# Patient Record
Sex: FEMALE | Race: WHITE | NOT HISPANIC OR LATINO | Employment: FULL TIME | URBAN - METROPOLITAN AREA
[De-identification: names, ages, dates, MRNs, and addresses within clinical notes are randomized per-mention and may not be internally consistent; named-entity substitution may affect disease eponyms.]

---

## 2017-02-21 ENCOUNTER — TELEPHONE (OUTPATIENT)
Dept: GASTROENTEROLOGY | Facility: CLINIC | Age: 57
End: 2017-02-21

## 2017-02-21 DIAGNOSIS — R10.13 EPIGASTRIC PAIN: Primary | ICD-10-CM

## 2017-02-21 NOTE — TELEPHONE ENCOUNTER
From     Radha Moody      To     [facility]      Sent     2/21/2017 10:27 AM            Good Morning!   I wanted to let you know that my stomach still hurts and was wondering if we should do an upper GI.  I have tried to keep track of foods or when it hurts, but there isn't any specific thing that sets it off. Also, my sister just had the test and she had two polyps.  Please let me know what we should do next. Thanks      Message sent to Dr Fenton.

## 2017-03-17 ENCOUNTER — OUTSIDE FACILITY SERVICE (OUTPATIENT)
Dept: GASTROENTEROLOGY | Facility: CLINIC | Age: 57
End: 2017-03-17

## 2017-03-17 PROCEDURE — 43239 EGD BIOPSY SINGLE/MULTIPLE: CPT | Performed by: INTERNAL MEDICINE

## 2017-03-23 ENCOUNTER — TELEPHONE (OUTPATIENT)
Dept: GASTROENTEROLOGY | Facility: CLINIC | Age: 57
End: 2017-03-23

## 2017-03-23 NOTE — TELEPHONE ENCOUNTER
Gastric polyps were benign - all other bx were normal.  Trial carafate tablets - sched o/v 6-8 weeks with me or np

## 2017-03-23 NOTE — TELEPHONE ENCOUNTER
Call to pt.  Advise per Dr Fenton that gastric polyps were benign - all other bx were normal.  Trial carafate tablets.  Pt verb understanding and states has prescription from Dr Fenton for Carafate fill.  Appt scheduled with JAMIE Camara, for 5/1/17 @ 5135.

## 2017-05-01 ENCOUNTER — OFFICE VISIT (OUTPATIENT)
Dept: GASTROENTEROLOGY | Facility: CLINIC | Age: 57
End: 2017-05-01

## 2017-05-01 VITALS
WEIGHT: 227.4 LBS | SYSTOLIC BLOOD PRESSURE: 132 MMHG | DIASTOLIC BLOOD PRESSURE: 82 MMHG | HEIGHT: 70 IN | BODY MASS INDEX: 32.56 KG/M2

## 2017-05-01 DIAGNOSIS — K63.5 COLON POLYPS: ICD-10-CM

## 2017-05-01 DIAGNOSIS — K59.04 CHRONIC IDIOPATHIC CONSTIPATION: ICD-10-CM

## 2017-05-01 DIAGNOSIS — K21.9 GASTROESOPHAGEAL REFLUX DISEASE WITHOUT ESOPHAGITIS: Primary | ICD-10-CM

## 2017-05-01 PROBLEM — K31.84 GASTROPARESIS: Status: ACTIVE | Noted: 2017-05-01

## 2017-05-01 PROCEDURE — 99213 OFFICE O/P EST LOW 20 MIN: CPT | Performed by: NURSE PRACTITIONER

## 2017-05-01 RX ORDER — CEVIMELINE HYDROCHLORIDE 30 MG/1
CAPSULE ORAL
Refills: 5 | COMMUNITY
Start: 2017-04-18 | End: 2017-05-01 | Stop reason: ALTCHOICE

## 2017-05-01 RX ORDER — ESCITALOPRAM OXALATE 20 MG/1
TABLET ORAL
Refills: 1 | COMMUNITY
Start: 2017-04-11 | End: 2019-07-23

## 2017-05-24 ENCOUNTER — TELEPHONE (OUTPATIENT)
Dept: GASTROENTEROLOGY | Facility: CLINIC | Age: 57
End: 2017-05-24

## 2017-05-24 RX ORDER — SUCRALFATE 1 G/1
TABLET ORAL
Qty: 360 TABLET | Refills: 0 | Status: SHIPPED | OUTPATIENT
Start: 2017-05-24 | End: 2017-09-12 | Stop reason: SDUPTHER

## 2017-07-19 ENCOUNTER — TELEPHONE (OUTPATIENT)
Dept: GASTROENTEROLOGY | Facility: CLINIC | Age: 57
End: 2017-07-19

## 2017-07-19 RX ORDER — DEXLANSOPRAZOLE 60 MG/1
60 CAPSULE, DELAYED RELEASE ORAL DAILY
Qty: 90 CAPSULE | Refills: 2 | Status: SHIPPED | OUTPATIENT
Start: 2017-07-19 | End: 2018-05-16 | Stop reason: SDUPTHER

## 2017-08-10 ENCOUNTER — APPOINTMENT (OUTPATIENT)
Dept: GENERAL RADIOLOGY | Facility: HOSPITAL | Age: 57
End: 2017-08-10

## 2017-08-10 ENCOUNTER — HOSPITAL ENCOUNTER (EMERGENCY)
Facility: HOSPITAL | Age: 57
Discharge: HOME | End: 2017-08-10
Attending: EMERGENCY MEDICINE | Admitting: EMERGENCY MEDICINE

## 2017-08-10 VITALS
OXYGEN SATURATION: 97 % | WEIGHT: 230 LBS | RESPIRATION RATE: 16 BRPM | HEART RATE: 72 BPM | SYSTOLIC BLOOD PRESSURE: 118 MMHG | BODY MASS INDEX: 32.93 KG/M2 | TEMPERATURE: 98.4 F | HEIGHT: 70 IN | DIASTOLIC BLOOD PRESSURE: 70 MMHG

## 2017-08-10 DIAGNOSIS — M79.671 ACUTE FOOT PAIN, RIGHT: Primary | ICD-10-CM

## 2017-08-10 PROCEDURE — 99283 EMERGENCY DEPT VISIT LOW MDM: CPT

## 2017-08-10 PROCEDURE — 73630 X-RAY EXAM OF FOOT: CPT

## 2017-08-10 RX ORDER — NAPROXEN 500 MG/1
500 TABLET ORAL 2 TIMES DAILY PRN
Qty: 15 TABLET | Refills: 0 | Status: SHIPPED | OUTPATIENT
Start: 2017-08-10 | End: 2020-01-17 | Stop reason: HOSPADM

## 2017-08-10 NOTE — ED PROVIDER NOTES
" EMERGENCY DEPARTMENT ENCOUNTER    CHIEF COMPLAINT  Chief Complaint: foot pain  History given by: pt  History limited by: none  Room Number: HAL/C  PMD: Radha Sepulveda MD      HPI:  Pt is a 57 y.o. female who presents complaining of R foot pain which onset about 1 week ago. Pt does not recall any injuries to the affected foot. In the last 2 days, pain has begun radiating up her RLE. She has no h/o gout and denies tobacco use. There are no other sx complaints at this time.     Duration:  1 week  Onset: gradual  Timing: constant  Location: R foot   Radiation: up RLE  Quality: \"pain\"  Intensity/Severity: moderate  Progression: unchanged  Associated Symptoms: none stated  Aggravating Factors: none stated  Alleviating Factors: none stated  Previous Episodes: none stated  Treatment before arrival: none stated    PAST MEDICAL HISTORY  Active Ambulatory Problems     Diagnosis Date Noted   • Chronic idiopathic constipation 05/01/2017   • Colon polyps 05/01/2017   • Gastroesophageal reflux disease without esophagitis 05/01/2017   • Gastroparesis 05/01/2017     Resolved Ambulatory Problems     Diagnosis Date Noted   • No Resolved Ambulatory Problems     Past Medical History:   Diagnosis Date   • Gastroparesis    • GERD (gastroesophageal reflux disease)    • HX: breast cancer 2010   • Hyperlipidemia    • Internal hemorrhoid    • RLS (restless legs syndrome)    • Thyroid disease        PAST SURGICAL HISTORY  Past Surgical History:   Procedure Laterality Date   • BREAST LUMPECTOMY     • BREAST RECONSTRUCTION     • CHOLECYSTECTOMY     • COLONOSCOPY  2011    Per pt. IH, diverticulitis, otherwise normal (dr. Matias)   • COLONOSCOPY N/A 6/22/2016    meghan;yps, tics, NBIH, hyperplastic polyp x 2   • UPPER GASTROINTESTINAL ENDOSCOPY  03/17/2017    multiple fundic gland polyps       FAMILY HISTORY  History reviewed. No pertinent family history.    SOCIAL HISTORY  Social History     Social History   • Marital status:  "     Spouse name: N/A   • Number of children: N/A   • Years of education: N/A     Occupational History   • Not on file.     Social History Main Topics   • Smoking status: Never Smoker   • Smokeless tobacco: Not on file   • Alcohol use Yes      Comment: occasssionally   • Drug use: No   • Sexual activity: Not on file     Other Topics Concern   • Not on file     Social History Narrative       ALLERGIES  Review of patient's allergies indicates no known allergies.    REVIEW OF SYSTEMS  Review of Systems   Constitutional: Negative for fever.   Eyes: Negative.    Musculoskeletal: Positive for arthralgias (R foot).   Skin: Negative for rash.   Neurological: Negative for weakness and numbness.   Hematological: Negative.    All other systems reviewed and are negative.      PHYSICAL EXAM  ED Triage Vitals   Temp Heart Rate Resp BP SpO2   08/10/17 1519 08/10/17 1519 08/10/17 1519 08/10/17 1519 08/10/17 1519   98.4 °F (36.9 °C) 108 18 148/93 98 %      Temp src Heart Rate Source Patient Position BP Location FiO2 (%)   08/10/17 1519 08/10/17 1519 08/10/17 1519 08/10/17 1519 --   Tympanic Monitor Standing Right arm        Physical Exam   Constitutional: She is oriented to person, place, and time and well-developed, well-nourished, and in no distress. No distress.   HENT:   Head: Normocephalic and atraumatic.   Eyes: EOM are normal.   Neck: Normal range of motion.   Pulmonary/Chest: Effort normal. No respiratory distress.   Musculoskeletal: She exhibits tenderness (minimal over top and distal foot ).   There is no achilles tenderness   Neurological: She is alert and oriented to person, place, and time.   Skin: Skin is warm and dry. No pallor.   Psychiatric: Mood, memory, affect and judgment normal.   Nursing note and vitals reviewed.        RADIOLOGY  XR Foot 3+ View Right        THREE-VIEW RIGHT FOOT     HISTORY: Foot pain. No definite trauma.     FINDINGS: There is some extremely minimal degenerative changes at the  1st MTP joint.  The foot is otherwise unremarkable.                I ordered the above noted radiological studies. Interpreted by radiologist.Reviewed by me in PACS.       PROCEDURES  Procedures      PROGRESS AND CONSULTS  ED Course   Comment By Time   5:13 PM  Patient with foot pain of unclear etiology.  Pain on palpation.  No redness to suggest gout or infection.  May have plantar fascitis.  Will treat with antiinflammatories.  Hard soled shoe.  Follow up with PMD.  Normal pulse and sensation. Solomon Mckeon MD 08/10 1714   1609: Ordered XR R Foot to r/o breaks/fractures.     1711: Pt is resting in NAD. Informed her of XR results which show early signs of arthritis but r/o fractures. Discussed unlikelihood of gout and plantar fascitis but did not completely r/o. Will d/c with anti-inflammatories. Pt will f/u with PCP if pain persists. Pt understands and agrees with the plan. All questions answered.      MEDICAL DECISION MAKING  Results were reviewed/discussed with the patient and they were also made aware of online access. Pt also made aware that some labs, such as cultures, will not be resulted during ER visit and follow up with PMD is necessary.     MDM  Number of Diagnoses or Management Options     Amount and/or Complexity of Data Reviewed  Tests in the radiology section of CPT®: ordered and reviewed (THREE-VIEW RIGHT FOOT     HISTORY: Foot pain. No definite trauma.     FINDINGS: There is some extremely minimal degenerative changes at the  1st MTP joint. The foot is otherwise unremarkable.)  Decide to obtain previous medical records or to obtain history from someone other than the patient: yes  Review and summarize past medical records: yes  Independent visualization of images, tracings, or specimens: yes    Patient Progress  Patient progress: stable         DIAGNOSIS  Final diagnoses:   Acute foot pain, right       DISPOSITION  DISCHARGE    Patient discharged in stable condition.    Reviewed implications of results,  diagnosis, meds, responsibility to follow up, warning signs and symptoms of possible worsening, potential complications and reasons to return to ER, including new or worsening sx.    Patient/Family voiced understanding of above instructions.    Discussed plan for discharge, as there is no emergent indication for admission.  Pt/family is agreeable and understands need for follow up and repeat testing.  Pt is aware that discharge does not mean that nothing is wrong but it indicates no emergency is present that requires admission and they must continue care with follow-up as given below or physician of their choice.     FOLLOW-UP  No follow-up provider specified.       Medication List      Notice     No changes were made to your prescriptions during this visit.            Latest Documented Vital Signs:  As of 5:15 PM  BP- 112/68 HR- 86 Temp- 98.4 °F (36.9 °C) (Tympanic) O2 sat- 97%    --  Documentation assistance provided by jessica Sánchez for Dr. Mckeon.  Information recorded by the scribe was done at my direction and has been verified and validated by me.       Chichi Sánchez  08/10/17 9043       Solomon Mckeon MD  08/10/17 2677

## 2017-09-12 RX ORDER — SUCRALFATE 1 G/1
TABLET ORAL
Qty: 360 TABLET | Refills: 0 | Status: SHIPPED | OUTPATIENT
Start: 2017-09-12 | End: 2019-07-23

## 2017-09-27 ENCOUNTER — TELEPHONE (OUTPATIENT)
Dept: GASTROENTEROLOGY | Facility: CLINIC | Age: 57
End: 2017-09-27

## 2017-09-27 NOTE — TELEPHONE ENCOUNTER
Faxed request received from [facility] re: Sucralfate 1000 mg tabs - one tab po ac meals and at hs.  Note that med is on back order and cannot get.  Asking if want to change to suspension.    Call to [facility] @ [phone] and spoke with pharmacist Иван.  He states that Carafate suspension cost for pt would be $5.  Advise may switch to Carafate Suspension 1 G/10 ml - take 1 G by mouth before meals and at bedtime, 360 G, R0.  Иван R&V.

## 2018-05-16 ENCOUNTER — TELEPHONE (OUTPATIENT)
Dept: GASTROENTEROLOGY | Facility: CLINIC | Age: 58
End: 2018-05-16

## 2018-05-16 RX ORDER — DEXLANSOPRAZOLE 60 MG/1
60 CAPSULE, DELAYED RELEASE ORAL DAILY
Qty: 90 CAPSULE | Refills: 0 | Status: SHIPPED | OUTPATIENT
Start: 2018-05-16 | End: 2018-06-14 | Stop reason: SDUPTHER

## 2018-05-16 NOTE — TELEPHONE ENCOUNTER
----- Message from Susanne Santos sent at 5/15/2018 12:30 PM EDT -----  Regarding: PT CALLED - DEXILANT SCRIPT 60MG QD  Contact: [phone]  PT REQUESTING 90 DAY SCRIPT BE CALLED INTO HER LOCAL PHARM. [facility]ARM ON FILE IS CORRECT. PHARM ADVISED HER IT WAS ON HOLD.

## 2018-05-16 NOTE — TELEPHONE ENCOUNTER
Called pt and advised pt she is due for her yearly f/u.  Advised pt she can make an appt and we can send in a 90 day supply of her dexilant to get her by till then.  Pt verb understanding and made appt for 06/14 at 1030am with Keisha Kramer.  Script for dexilant 60mg po daily #90 was escribed to pt's [facility] pharmacy.

## 2018-06-14 ENCOUNTER — OFFICE VISIT (OUTPATIENT)
Dept: GASTROENTEROLOGY | Facility: CLINIC | Age: 58
End: 2018-06-14

## 2018-06-14 VITALS
SYSTOLIC BLOOD PRESSURE: 108 MMHG | BODY MASS INDEX: 31.61 KG/M2 | HEIGHT: 70 IN | WEIGHT: 220.8 LBS | TEMPERATURE: 98.6 F | DIASTOLIC BLOOD PRESSURE: 70 MMHG

## 2018-06-14 DIAGNOSIS — Z83.71 FAMILY HISTORY OF POLYPS IN THE COLON: ICD-10-CM

## 2018-06-14 DIAGNOSIS — K63.5 HYPERPLASTIC COLONIC POLYP, UNSPECIFIED PART OF COLON: ICD-10-CM

## 2018-06-14 DIAGNOSIS — K59.04 CHRONIC IDIOPATHIC CONSTIPATION: Primary | ICD-10-CM

## 2018-06-14 DIAGNOSIS — K21.9 GASTROESOPHAGEAL REFLUX DISEASE WITHOUT ESOPHAGITIS: ICD-10-CM

## 2018-06-14 PROCEDURE — 99214 OFFICE O/P EST MOD 30 MIN: CPT | Performed by: NURSE PRACTITIONER

## 2018-06-14 RX ORDER — LISINOPRIL 2.5 MG/1
2.5 TABLET ORAL DAILY
COMMUNITY
End: 2019-07-23

## 2018-06-14 RX ORDER — DEXLANSOPRAZOLE 60 MG/1
60 CAPSULE, DELAYED RELEASE ORAL DAILY
Qty: 90 CAPSULE | Refills: 3 | Status: SHIPPED | OUTPATIENT
Start: 2018-06-14 | End: 2019-07-23 | Stop reason: SDUPTHER

## 2018-06-14 RX ORDER — ATORVASTATIN CALCIUM 20 MG/1
20 TABLET, FILM COATED ORAL
COMMUNITY
Start: 2018-04-02 | End: 2019-04-02

## 2018-06-14 RX ORDER — OMEGA-3-ACID ETHYL ESTERS 1 G/1
4 CAPSULE, LIQUID FILLED ORAL
COMMUNITY
Start: 2018-03-14 | End: 2019-03-14

## 2018-06-14 NOTE — PROGRESS NOTES
Chief Complaint   Patient presents with   • Follow-up   • Heartburn       Radha Moody is a  57 y.o. female here for a follow up visit for GERD and constipation.     HPI  58 yo f presents today for follow up visit for GERD and constipation. She is a patient of Dr. Fenton. She was last seen in office on 5/2017. She has hx GERD and does well on Dexilant 60 mg daily. She also has hx constipation and admits the Linzess 145 daily is just too much. She has not tried the Linzess 72. She denies any dysphagia, reflux, abd pain, N&V, diarrhea, rectal bleeding or melena. She admits her appetite is good and her weight is stable. She does eat activia now and believes that helps some. Her last EGD was done 3/2017 and her last Colonoscopy was done 6/2016. She does have hx diverticulosis, NBIH and HP colon polyps. She has FH pre-cancerous colon polyps. Next surveillance colonoscopy will be in 6/2021.     Past Medical History:   Diagnosis Date   • Gastroparesis    • GERD (gastroesophageal reflux disease)    • HX: breast cancer 2010   • Hyperlipidemia    • Internal hemorrhoid    • RLS (restless legs syndrome)    • Thyroid disease        Past Surgical History:   Procedure Laterality Date   • BREAST LUMPECTOMY     • BREAST RECONSTRUCTION     • CHOLECYSTECTOMY     • COLONOSCOPY  2011    Per pt. IH, diverticulitis, otherwise normal (dr. Matias)   • COLONOSCOPY N/A 6/22/2016    meghan;yps, tics, NBIH, hyperplastic polyp x 2   • UPPER GASTROINTESTINAL ENDOSCOPY  03/17/2017    multiple fundic gland polyps       Scheduled Meds:    Continuous Infusions:  No current facility-administered medications for this visit.     PRN Meds:.    No Known Allergies    Social History     Social History   • Marital status:      Spouse name: N/A   • Number of children: N/A   • Years of education: N/A     Occupational History   • Not on file.     Social History Main Topics   • Smoking status: Never Smoker   • Smokeless tobacco: Not on file   • Alcohol  use Yes      Comment: occasssionally   • Drug use: No   • Sexual activity: Not on file     Other Topics Concern   • Not on file     Social History Narrative   • No narrative on file       History reviewed. No pertinent family history.    Review of Systems   Constitutional: Negative for appetite change, chills, diaphoresis, fatigue, fever and unexpected weight change.   HENT: Negative for nosebleeds, postnasal drip, sore throat, trouble swallowing and voice change.    Respiratory: Negative for cough, choking, chest tightness, shortness of breath and wheezing.    Cardiovascular: Negative for chest pain.   Gastrointestinal: Positive for constipation. Negative for abdominal distention, abdominal pain, anal bleeding, blood in stool, diarrhea, nausea, rectal pain and vomiting.   Endocrine: Negative for polydipsia, polyphagia and polyuria.   Musculoskeletal: Negative for gait problem.   Skin: Negative for rash and wound.   Allergic/Immunologic: Negative for food allergies.   Neurological: Negative for dizziness, speech difficulty and light-headedness.   Psychiatric/Behavioral: Negative for confusion, self-injury, sleep disturbance and suicidal ideas.       Vitals:    06/14/18 1033   BP: 108/70   Temp: 98.6 °F (37 °C)       Physical Exam   Constitutional: She is oriented to person, place, and time. She appears well-developed and well-nourished. She does not appear ill. No distress.   HENT:   Head: Normocephalic.   Eyes: Pupils are equal, round, and reactive to light.   Cardiovascular: Normal rate, regular rhythm and normal heart sounds.    Pulmonary/Chest: Effort normal and breath sounds normal.   Abdominal: Soft. Bowel sounds are normal. She exhibits no distension and no mass. There is no hepatosplenomegaly. There is no tenderness. There is no rebound and no guarding. No hernia.   Musculoskeletal: Normal range of motion.   Neurological: She is alert and oriented to person, place, and time.   Skin: Skin is warm and dry.    Psychiatric: She has a normal mood and affect. Her speech is normal and behavior is normal. Judgment normal.       No images are attached to the encounter.    Assessment & Plan    1. Chronic idiopathic constipation    2. Gastroesophageal reflux disease without esophagitis  - dexlansoprazole (DEXILANT) 60 MG capsule; Take 1 capsule by mouth Daily.  Dispense: 90 capsule; Refill: 3    3. Hyperplastic colonic polyp, unspecified part of colon    4. Family history of polyps in the colon    GERD is well controlled on Dexilant. I will refill x 1 year. Constipation is not well controlled. Will give samples of the Linzess 72 mcg daily for patient to try. Patient to call office in 2-3 weeks with update. Follow up with Dr. Fenton in 1 year. Call office with any issues.

## 2018-07-16 ENCOUNTER — TELEPHONE (OUTPATIENT)
Dept: GASTROENTEROLOGY | Facility: CLINIC | Age: 58
End: 2018-07-16

## 2018-07-16 NOTE — TELEPHONE ENCOUNTER
----- Message from Radha Moody sent at 7/16/2018  1:15 PM EDT -----  Regarding: Prescription Question  Contact: [phone]  The samples of Linzess you gave me were perfect. Please refill the 72 mg rx at [facility]. Thanks Radha

## 2018-11-12 ENCOUNTER — APPOINTMENT (OUTPATIENT)
Dept: WOMENS IMAGING | Facility: HOSPITAL | Age: 58
End: 2018-11-12

## 2018-11-12 PROCEDURE — 77080 DXA BONE DENSITY AXIAL: CPT | Performed by: RADIOLOGY

## 2018-12-11 ENCOUNTER — HOSPITAL ENCOUNTER (OUTPATIENT)
Dept: CARDIOLOGY | Facility: HOSPITAL | Age: 58
Discharge: HOME | End: 2018-12-11
Attending: OTOLARYNGOLOGY | Admitting: OTOLARYNGOLOGY

## 2018-12-11 ENCOUNTER — TRANSCRIBE ORDERS (OUTPATIENT)
Dept: ADMINISTRATIVE | Facility: HOSPITAL | Age: 58
End: 2018-12-11

## 2018-12-11 DIAGNOSIS — J34.3 HYPERTROPHY OF NASAL TURBINATES: ICD-10-CM

## 2018-12-11 DIAGNOSIS — J34.3 HYPERTROPHY OF NASAL TURBINATES: Primary | ICD-10-CM

## 2018-12-11 PROCEDURE — 93005 ELECTROCARDIOGRAM TRACING: CPT | Performed by: OTOLARYNGOLOGY

## 2018-12-11 PROCEDURE — 93010 ELECTROCARDIOGRAM REPORT: CPT | Performed by: INTERNAL MEDICINE

## 2019-05-28 ENCOUNTER — OFFICE VISIT (OUTPATIENT)
Dept: RETAIL CLINIC | Facility: CLINIC | Age: 59
End: 2019-05-28

## 2019-05-28 DIAGNOSIS — L73.9 FOLLICULITIS: Primary | ICD-10-CM

## 2019-05-28 PROCEDURE — 99213 OFFICE O/P EST LOW 20 MIN: CPT | Performed by: NURSE PRACTITIONER

## 2019-05-28 RX ORDER — TRIAMCINOLONE ACETONIDE 5 MG/G
CREAM TOPICAL 3 TIMES DAILY
Qty: 30 G | Refills: 0 | Status: SHIPPED | OUTPATIENT
Start: 2019-05-28 | End: 2020-02-13

## 2019-05-28 RX ORDER — VENLAFAXINE HYDROCHLORIDE 150 MG/1
225 CAPSULE, EXTENDED RELEASE ORAL NIGHTLY
COMMUNITY
End: 2022-10-26

## 2019-05-28 NOTE — PATIENT INSTRUCTIONS
Folliculitis  Folliculitis is inflammation of the hair follicles. Folliculitis most commonly occurs on the scalp, thighs, legs, back, and buttocks. However, it can occur anywhere on the body.  What are the causes?  This condition may be caused by:  · A bacterial infection (common).  · A fungal infection.  · A viral infection.  · Coming into contact with certain chemicals, especially oils and tars.  · Shaving or waxing.  · Applying greasy ointments or creams to your skin often.    Long-lasting folliculitis and folliculitis that keeps coming back can be caused by bacteria that live in the nostrils.  What increases the risk?  This condition is more likely to develop in people with:  · A weakened immune system.  · Diabetes.  · Obesity.    What are the signs or symptoms?  Symptoms of this condition include:  · Redness.  · Soreness.  · Swelling.  · Itching.  · Small white or yellow, pus-filled, itchy spots (pustules) that appear over a reddened area. If there is an infection that goes deep into the follicle, these may develop into a boil (furuncle).  · A group of closely packed boils (carbuncle). These tend to form in hairy, sweaty areas of the body.    How is this diagnosed?  This condition is diagnosed with a skin exam. To find what is causing the condition, your health care provider may take a sample of one of the pustules or boils for testing.  How is this treated?  This condition may be treated by:  · Applying warm compresses to the affected areas.  · Taking an antibiotic medicine or applying an antibiotic medicine to the skin.  · Applying or bathing with an antiseptic solution.  · Taking an over-the-counter medicine to help with itching.  · Having a procedure to drain any pustules or boils. This may be done if a pustule or boil contains a lot of pus or fluid.  · Laser hair removal. This may be done to treat long-lasting folliculitis.    Follow these instructions at home:  · If directed, apply heat to the affected  area as often as told by your health care provider. Use the heat source that your health care provider recommends, such as a moist heat pack or a heating pad.  ? Place a towel between your skin and the heat source.  ? Leave the heat on for 20-30 minutes.  ? Remove the heat if your skin turns bright red. This is especially important if you are unable to feel pain, heat, or cold. You may have a greater risk of getting burned.  · If you were prescribed an antibiotic medicine, use it as told by your health care provider. Do not stop using the antibiotic even if you start to feel better.  · Take over-the-counter and prescription medicines only as told by your health care provider.  · Do not shave irritated skin.  · Keep all follow-up visits as told by your health care provider. This is important.  Get help right away if:  · You have more redness, swelling, or pain in the affected area.  · Red streaks are spreading from the affected area.  · You have a fever.  This information is not intended to replace advice given to you by your health care provider. Make sure you discuss any questions you have with your health care provider.  Document Released: 02/26/2003 Document Revised: 07/07/2017 Document Reviewed: 10/07/2016  Elsevier Interactive Patient Education © 2019 [employer]

## 2019-05-28 NOTE — PROGRESS NOTES
Subjective:     Radha Moody is a 58 y.o.     HPI      The following portions of the patient's history were reviewed and updated as appropriate: allergies, current medications, past family history, past medical history, past social history, past surgical history and problem list.      Review of Systems      Objective:      Physical Exam        There are no diagnoses linked to this encounter.

## 2019-05-28 NOTE — PROGRESS NOTES
Subjective:     Radha Moody is a 58 y.o.     Rash   Episode onset: started 5 days ago. The affected locations include the right axilla and left axilla. The rash is characterized by redness, itchiness and burning. She was exposed to nothing. Pertinent negatives include no fever, shortness of breath or sore throat. Treatments tried: peroxide, baby powder, facial cleanser. The treatment provided no relief.         The following portions of the patient's history were reviewed and updated as appropriate: allergies, current medications, past family history, past medical history, past social history, past surgical history and problem list.      Review of Systems   Constitutional: Negative for fever.   HENT: Negative for sore throat.    Respiratory: Negative for shortness of breath.    Skin: Positive for rash (under both arms, red and itchy).         Objective:      Physical Exam   Cardiovascular: Normal rate, regular rhythm, S1 normal, S2 normal and normal heart sounds.   Pulmonary/Chest: Effort normal and breath sounds normal.   Skin: Rash (bilateral axillae erythematous folliculi noted, one pustular) noted.   Vitals reviewed.          Diagnoses and all orders for this visit:    Folliculitis    Other orders  -     triamcinolone (KENALOG) 0.5 % cream; Apply  topically to the appropriate area as directed 3 (Three) Times a Day. To affected area for contact dermatitis  -     mupirocin (BACTROBAN) 2 % ointment; Apply  topically to the appropriate area as directed 2 (Two) Times a Day for 7 days.

## 2019-06-27 DIAGNOSIS — K21.9 GASTROESOPHAGEAL REFLUX DISEASE WITHOUT ESOPHAGITIS: ICD-10-CM

## 2019-06-27 RX ORDER — DEXLANSOPRAZOLE 60 MG/1
CAPSULE, DELAYED RELEASE ORAL
Qty: 90 CAPSULE | Refills: 3 | OUTPATIENT
Start: 2019-06-27

## 2019-07-23 ENCOUNTER — OFFICE VISIT (OUTPATIENT)
Dept: GASTROENTEROLOGY | Facility: CLINIC | Age: 59
End: 2019-07-23

## 2019-07-23 VITALS
TEMPERATURE: 97.8 F | WEIGHT: 217.4 LBS | SYSTOLIC BLOOD PRESSURE: 122 MMHG | HEIGHT: 70 IN | DIASTOLIC BLOOD PRESSURE: 68 MMHG | BODY MASS INDEX: 31.12 KG/M2

## 2019-07-23 DIAGNOSIS — K63.5 HYPERPLASTIC COLONIC POLYP, UNSPECIFIED PART OF COLON: ICD-10-CM

## 2019-07-23 DIAGNOSIS — K21.9 GASTROESOPHAGEAL REFLUX DISEASE WITHOUT ESOPHAGITIS: ICD-10-CM

## 2019-07-23 DIAGNOSIS — K59.04 CHRONIC IDIOPATHIC CONSTIPATION: Primary | ICD-10-CM

## 2019-07-23 PROCEDURE — 99213 OFFICE O/P EST LOW 20 MIN: CPT | Performed by: NURSE PRACTITIONER

## 2019-07-23 RX ORDER — ATORVASTATIN CALCIUM 80 MG/1
80 TABLET, FILM COATED ORAL NIGHTLY
COMMUNITY
Start: 2018-09-28

## 2019-07-23 RX ORDER — SITAGLIPTIN 100 MG/1
100 TABLET, FILM COATED ORAL DAILY
COMMUNITY
Start: 2018-09-28 | End: 2020-02-13

## 2019-07-23 RX ORDER — DEXLANSOPRAZOLE 60 MG/1
60 CAPSULE, DELAYED RELEASE ORAL DAILY
Qty: 90 CAPSULE | Refills: 3 | Status: SHIPPED | OUTPATIENT
Start: 2019-07-23 | End: 2020-07-09

## 2019-07-23 RX ORDER — OMEGA-3-ACID ETHYL ESTERS 1 G/1
4 CAPSULE, LIQUID FILLED ORAL NIGHTLY
COMMUNITY
Start: 2018-03-14 | End: 2022-10-26

## 2019-07-23 NOTE — PROGRESS NOTES
Chief Complaint   Patient presents with   • Constipation   • Heartburn       Radha Moody is a  59 y.o. female here for a follow up visit for GERD.    HPI  60 yo f presents today for follow up visit for GERD. She is a patient of Dr. Fenton. She was last seen in the office on 6/2018. She has hx GERD and admits she does well with Dexilant 60 mg daily. She denies any dysphagia, reflux, abd pain, N&V, diarrhea, rectal bleeding or melena. She also has hx chronic constipation and admits she is doing well with Linzess 72 daily. She admits her appetite is good and her weight is stable.       She does have hx HP polyps and FH colon polyps. Her last colonoscopy was done 6/2016. Her next scope will be due 6/2021.       Past Medical History:   Diagnosis Date   • Gastroparesis    • GERD (gastroesophageal reflux disease)    • HX: breast cancer 2010   • Hyperlipidemia    • Internal hemorrhoid    • RLS (restless legs syndrome)    • Thyroid disease        Past Surgical History:   Procedure Laterality Date   • BREAST LUMPECTOMY     • BREAST RECONSTRUCTION     • CHOLECYSTECTOMY     • COLONOSCOPY  2011    Per pt. IH, diverticulitis, otherwise normal (dr. Matias)   • COLONOSCOPY N/A 6/22/2016    polyps, tics, NBIH, hyperplastic polyp x 2   • UPPER GASTROINTESTINAL ENDOSCOPY  03/17/2017    multiple fundic gland polyps       Scheduled Meds:    Continuous Infusions:  No current facility-administered medications for this visit.     PRN Meds:.    No Known Allergies    Social History     Socioeconomic History   • Marital status:      Spouse name: Not on file   • Number of children: Not on file   • Years of education: Not on file   • Highest education level: Not on file   Tobacco Use   • Smoking status: Never Smoker   • Smokeless tobacco: Never Used   Substance and Sexual Activity   • Alcohol use: Yes     Comment: occasssionally   • Drug use: No       Family History   Problem Relation Age of Onset   • Colon polyps Sister    • Colon  polyps Sister    • Colon polyps Sister    • Colon polyps Sister        Review of Systems   Constitutional: Negative for appetite change, chills, diaphoresis, fatigue, fever and unexpected weight change.   HENT: Negative for nosebleeds, postnasal drip, sore throat, trouble swallowing and voice change.    Respiratory: Negative for cough, choking, chest tightness, shortness of breath and wheezing.    Cardiovascular: Negative for chest pain, palpitations and leg swelling.   Gastrointestinal: Negative for abdominal distention, abdominal pain, anal bleeding, blood in stool, constipation, diarrhea, nausea, rectal pain and vomiting.   Endocrine: Negative for polydipsia, polyphagia and polyuria.   Musculoskeletal: Negative for gait problem.   Skin: Negative for rash and wound.   Allergic/Immunologic: Negative for food allergies.   Neurological: Negative for dizziness, speech difficulty and light-headedness.   Psychiatric/Behavioral: Negative for confusion, self-injury, sleep disturbance and suicidal ideas.       Vitals:    07/23/19 1027   BP: 122/68   Temp: 97.8 °F (36.6 °C)       Physical Exam   Constitutional: She is oriented to person, place, and time. She appears well-developed and well-nourished. She does not appear ill. No distress.   HENT:   Head: Normocephalic.   Eyes: Pupils are equal, round, and reactive to light.   Cardiovascular: Normal rate, regular rhythm and normal heart sounds.   Pulmonary/Chest: Effort normal and breath sounds normal.   Abdominal: Soft. Bowel sounds are normal. She exhibits no distension and no mass. There is no hepatosplenomegaly. There is no tenderness. There is no rebound and no guarding. No hernia.   Musculoskeletal: Normal range of motion.   Neurological: She is alert and oriented to person, place, and time.   Skin: Skin is warm and dry.   Psychiatric: She has a normal mood and affect. Her speech is normal and behavior is normal. Judgment normal.       No images are attached to the  encounter.    Assessment & Plan    1. Chronic idiopathic constipation    2. Gastroesophageal reflux disease without esophagitis  - dexlansoprazole (DEXILANT) 60 MG capsule; Take 1 capsule by mouth Daily.  Dispense: 90 capsule; Refill: 3    3. Hyperplastic colonic polyp, unspecified part of colon    GERD is well controlled with PPI daily. Continue GERD precautions. Constipation is doing well with Linzess 72 daily. Will give refills x 1 year. Call office with any issues. Next colonoscopy due 6/2021. Follow up with me in 1 year.

## 2020-01-06 ENCOUNTER — TELEPHONE (OUTPATIENT)
Dept: GASTROENTEROLOGY | Facility: CLINIC | Age: 60
End: 2020-01-06

## 2020-01-06 NOTE — TELEPHONE ENCOUNTER
----- Message from Radha Moody sent at 1/6/2020  9:35 AM EST -----  Regarding: Test Results Question  Contact: [phone]  HERE IS THE NEW REPORT WITH ALL THE INFORMATION. I DID NOT FULLY SCAN THE REPORT.

## 2020-01-06 NOTE — TELEPHONE ENCOUNTER
----- Message from Radha Arguello sent at 1/6/2020 10:02 AM EST -----  Regarding: Question  Contact: [phone]  Pt is calling regarding questions

## 2020-01-06 NOTE — TELEPHONE ENCOUNTER
Called pt and she reports that her oncologist ordered the ct scan but recommends she f/u with Dr Fenton.  Pt is asking what are Dr Fenton thoughts are her ct results ( found under media tab).  Advised will send message to Dr Fenton. Pt verb understanding.

## 2020-01-06 NOTE — TELEPHONE ENCOUNTER
Labs show fatty liver and findings possible for increased portal pressure (can be seen with cirrhosis) but the liver does not appear cirrhotic on CT - recommend office f/u to discuss further - I can see her 2/13 - have leeanna add her to am schedule

## 2020-01-06 NOTE — TELEPHONE ENCOUNTER
----- Message from Sheron Gilbert sent at 1/6/2020  8:19 AM EST -----  Regarding: Non-Urgent Medical Question  Contact: [phone]  I NEED FOR THIS REPORT TO BE SEEN BY DR. HERRERA AND TO FIND OUT WHAT I NEED TO DO NEXT. I WAS ADVISED BY DR. NANCY HUBBARD AND DR. RODERICK ORTIZ THAT I FOLLOW UP WITH DR. HERRERA. I'M ASSUMING I SHOULD MAKE AN APPOINTMENT TO DISCUSS THIS FINDING AND TO FIND OUT WHAT WE NEED TO DO. THANK YOU.  SHERON GILBERT

## 2020-01-07 NOTE — TELEPHONE ENCOUNTER
call back   Received: Today   Message Contents   Radha Arguello [facility] Clinical [address] [place]   Phone Number: [phone]             Pt was calling asking to talk with a nurse,also she said yes she can make the appoinment on 2/13 at 9am      Call to pt.  Advise per DR Fenton that labs show fatty liver and findings possible for increased portal pressure, but the liver does not appear cirrhotic on CT.  Recommend office f/u.     Pt verb understanding.  Accepts appt 2/13 @ 9am.  Asking to be placed on cancellation list.    Message to Manager.

## 2020-01-14 ENCOUNTER — HOSPITAL ENCOUNTER (INPATIENT)
Facility: HOSPITAL | Age: 60
LOS: 3 days | Discharge: HOME | End: 2020-01-17
Attending: EMERGENCY MEDICINE | Admitting: HOSPITALIST

## 2020-01-14 ENCOUNTER — APPOINTMENT (OUTPATIENT)
Dept: CT IMAGING | Facility: HOSPITAL | Age: 60
End: 2020-01-14

## 2020-01-14 DIAGNOSIS — R10.84 GENERALIZED ABDOMINAL PAIN: ICD-10-CM

## 2020-01-14 DIAGNOSIS — K56.7 ILEUS (HCC): ICD-10-CM

## 2020-01-14 DIAGNOSIS — K52.9 ENTERITIS: Primary | ICD-10-CM

## 2020-01-14 LAB
ALBUMIN SERPL-MCNC: 4.8 G/DL (ref 3.5–5.2)
ALBUMIN/GLOB SERPL: 1.3 G/DL
ALP SERPL-CCNC: 64 U/L (ref 39–117)
ALT SERPL W P-5'-P-CCNC: 17 U/L (ref 1–33)
ANION GAP SERPL CALCULATED.3IONS-SCNC: 15.5 MMOL/L (ref 5–15)
AST SERPL-CCNC: 19 U/L (ref 1–32)
BASOPHILS # BLD AUTO: 0.01 10*3/MM3 (ref 0–0.2)
BASOPHILS NFR BLD AUTO: 0.1 % (ref 0–1.5)
BILIRUB SERPL-MCNC: 1.1 MG/DL (ref 0.2–1.2)
BILIRUB UR QL STRIP: NEGATIVE
BUN BLD-MCNC: 19 MG/DL (ref 6–20)
BUN/CREAT SERPL: 15.2 (ref 7–25)
CALCIUM SPEC-SCNC: 10.3 MG/DL (ref 8.6–10.5)
CHLORIDE SERPL-SCNC: 101 MMOL/L (ref 98–107)
CLARITY UR: CLEAR
CO2 SERPL-SCNC: 25.5 MMOL/L (ref 22–29)
COLOR UR: YELLOW
CREAT BLD-MCNC: 1.25 MG/DL (ref 0.57–1)
D-LACTATE SERPL-SCNC: 0.8 MMOL/L (ref 0.5–2)
DEPRECATED RDW RBC AUTO: 45.1 FL (ref 37–54)
EOSINOPHIL # BLD AUTO: 0.26 10*3/MM3 (ref 0–0.4)
EOSINOPHIL NFR BLD AUTO: 1.8 % (ref 0.3–6.2)
ERYTHROCYTE [DISTWIDTH] IN BLOOD BY AUTOMATED COUNT: 15.5 % (ref 12.3–15.4)
GFR SERPL CREATININE-BSD FRML MDRD: 44 ML/MIN/1.73
GLOBULIN UR ELPH-MCNC: 3.8 GM/DL
GLUCOSE BLD-MCNC: 162 MG/DL (ref 65–99)
GLUCOSE UR STRIP-MCNC: NEGATIVE MG/DL
HCT VFR BLD AUTO: 43.4 % (ref 34–46.6)
HGB BLD-MCNC: 14.1 G/DL (ref 12–15.9)
HGB UR QL STRIP.AUTO: ABNORMAL
IMM GRANULOCYTES # BLD AUTO: 0.09 10*3/MM3 (ref 0–0.05)
IMM GRANULOCYTES NFR BLD AUTO: 0.6 % (ref 0–0.5)
KETONES UR QL STRIP: NEGATIVE
LEUKOCYTE ESTERASE UR QL STRIP.AUTO: NEGATIVE
LIPASE SERPL-CCNC: 23 U/L (ref 13–60)
LYMPHOCYTES # BLD AUTO: 1.77 10*3/MM3 (ref 0.7–3.1)
LYMPHOCYTES NFR BLD AUTO: 12.1 % (ref 19.6–45.3)
MCH RBC QN AUTO: 26 PG (ref 26.6–33)
MCHC RBC AUTO-ENTMCNC: 32.5 G/DL (ref 31.5–35.7)
MCV RBC AUTO: 80.1 FL (ref 79–97)
MONOCYTES # BLD AUTO: 0.89 10*3/MM3 (ref 0.1–0.9)
MONOCYTES NFR BLD AUTO: 6.1 % (ref 5–12)
NEUTROPHILS # BLD AUTO: 11.6 10*3/MM3 (ref 1.7–7)
NEUTROPHILS NFR BLD AUTO: 79.3 % (ref 42.7–76)
NITRITE UR QL STRIP: NEGATIVE
NRBC BLD AUTO-RTO: 0 /100 WBC (ref 0–0.2)
PH UR STRIP.AUTO: <=5 [PH] (ref 5–8)
PLATELET # BLD AUTO: 192 10*3/MM3 (ref 140–450)
PMV BLD AUTO: 11.2 FL (ref 6–12)
POTASSIUM BLD-SCNC: 3.5 MMOL/L (ref 3.5–5.2)
PROCALCITONIN SERPL-MCNC: 0.08 NG/ML (ref 0.1–0.25)
PROT SERPL-MCNC: 8.6 G/DL (ref 6–8.5)
PROT UR QL STRIP: NEGATIVE
RBC # BLD AUTO: 5.42 10*6/MM3 (ref 3.77–5.28)
SODIUM BLD-SCNC: 142 MMOL/L (ref 136–145)
SP GR UR STRIP: >=1.03 (ref 1–1.03)
UROBILINOGEN UR QL STRIP: ABNORMAL
WBC NRBC COR # BLD: 14.62 10*3/MM3 (ref 3.4–10.8)

## 2020-01-14 PROCEDURE — 36415 COLL VENOUS BLD VENIPUNCTURE: CPT

## 2020-01-14 PROCEDURE — 83605 ASSAY OF LACTIC ACID: CPT | Performed by: EMERGENCY MEDICINE

## 2020-01-14 PROCEDURE — 25010000002 IOPAMIDOL 61 % SOLUTION: Performed by: EMERGENCY MEDICINE

## 2020-01-14 PROCEDURE — 80053 COMPREHEN METABOLIC PANEL: CPT | Performed by: EMERGENCY MEDICINE

## 2020-01-14 PROCEDURE — 74177 CT ABD & PELVIS W/CONTRAST: CPT

## 2020-01-14 PROCEDURE — 85025 COMPLETE CBC W/AUTO DIFF WBC: CPT | Performed by: EMERGENCY MEDICINE

## 2020-01-14 PROCEDURE — 99284 EMERGENCY DEPT VISIT MOD MDM: CPT

## 2020-01-14 PROCEDURE — 25010000002 ONDANSETRON PER 1 MG: Performed by: EMERGENCY MEDICINE

## 2020-01-14 PROCEDURE — 81001 URINALYSIS AUTO W/SCOPE: CPT | Performed by: EMERGENCY MEDICINE

## 2020-01-14 PROCEDURE — 84145 PROCALCITONIN (PCT): CPT | Performed by: EMERGENCY MEDICINE

## 2020-01-14 PROCEDURE — 83690 ASSAY OF LIPASE: CPT | Performed by: EMERGENCY MEDICINE

## 2020-01-14 PROCEDURE — 87040 BLOOD CULTURE FOR BACTERIA: CPT | Performed by: EMERGENCY MEDICINE

## 2020-01-14 PROCEDURE — 25010000002 MORPHINE PER 10 MG: Performed by: EMERGENCY MEDICINE

## 2020-01-14 RX ORDER — HYDROMORPHONE HYDROCHLORIDE 1 MG/ML
0.5 INJECTION, SOLUTION INTRAMUSCULAR; INTRAVENOUS; SUBCUTANEOUS ONCE
Status: DISCONTINUED | OUTPATIENT
Start: 2020-01-14 | End: 2020-01-15

## 2020-01-14 RX ORDER — SODIUM CHLORIDE 9 MG/ML
125 INJECTION, SOLUTION INTRAVENOUS CONTINUOUS
Status: DISCONTINUED | OUTPATIENT
Start: 2020-01-14 | End: 2020-01-15

## 2020-01-14 RX ORDER — MORPHINE SULFATE 2 MG/ML
4 INJECTION, SOLUTION INTRAMUSCULAR; INTRAVENOUS ONCE
Status: COMPLETED | OUTPATIENT
Start: 2020-01-14 | End: 2020-01-14

## 2020-01-14 RX ORDER — ONDANSETRON 2 MG/ML
4 INJECTION INTRAMUSCULAR; INTRAVENOUS ONCE
Status: COMPLETED | OUTPATIENT
Start: 2020-01-14 | End: 2020-01-14

## 2020-01-14 RX ADMIN — SODIUM CHLORIDE 1000 ML: 9 INJECTION, SOLUTION INTRAVENOUS at 21:29

## 2020-01-14 RX ADMIN — SODIUM CHLORIDE 500 ML: 9 INJECTION, SOLUTION INTRAVENOUS at 23:19

## 2020-01-14 RX ADMIN — IOPAMIDOL 85 ML: 612 INJECTION, SOLUTION INTRAVENOUS at 22:24

## 2020-01-14 RX ADMIN — MORPHINE SULFATE 4 MG: 2 INJECTION, SOLUTION INTRAMUSCULAR; INTRAVENOUS at 21:43

## 2020-01-14 RX ADMIN — SODIUM CHLORIDE 125 ML/HR: 9 INJECTION, SOLUTION INTRAVENOUS at 23:22

## 2020-01-14 RX ADMIN — ONDANSETRON 4 MG: 2 INJECTION INTRAMUSCULAR; INTRAVENOUS at 21:32

## 2020-01-15 LAB
ADV 40+41 DNA STL QL NAA+NON-PROBE: NOT DETECTED
ANION GAP SERPL CALCULATED.3IONS-SCNC: 13.7 MMOL/L (ref 5–15)
ASTRO TYP 1-8 RNA STL QL NAA+NON-PROBE: NOT DETECTED
BACTERIA UR QL AUTO: ABNORMAL /HPF
BUN BLD-MCNC: 17 MG/DL (ref 6–20)
BUN/CREAT SERPL: 19.3 (ref 7–25)
C CAYETANENSIS DNA STL QL NAA+NON-PROBE: NOT DETECTED
CALCIUM SPEC-SCNC: 8.8 MG/DL (ref 8.6–10.5)
CAMPY SP DNA.DIARRHEA STL QL NAA+PROBE: NOT DETECTED
CHLORIDE SERPL-SCNC: 107 MMOL/L (ref 98–107)
CO2 SERPL-SCNC: 22.3 MMOL/L (ref 22–29)
CREAT BLD-MCNC: 0.88 MG/DL (ref 0.57–1)
CRYPTOSP STL CULT: NOT DETECTED
DEPRECATED RDW RBC AUTO: 44.2 FL (ref 37–54)
E COLI DNA SPEC QL NAA+PROBE: NOT DETECTED
E HISTOLYT AG STL-ACNC: NOT DETECTED
EAEC PAA PLAS AGGR+AATA ST NAA+NON-PRB: NOT DETECTED
EC STX1 + STX2 GENES STL NAA+PROBE: NOT DETECTED
EPEC EAE GENE STL QL NAA+NON-PROBE: NOT DETECTED
ERYTHROCYTE [DISTWIDTH] IN BLOOD BY AUTOMATED COUNT: 15 % (ref 12.3–15.4)
ETEC LTA+ST1A+ST1B TOX ST NAA+NON-PROBE: NOT DETECTED
G LAMBLIA DNA SPEC QL NAA+PROBE: NOT DETECTED
GFR SERPL CREATININE-BSD FRML MDRD: 66 ML/MIN/1.73
GLUCOSE BLD-MCNC: 131 MG/DL (ref 65–99)
HCT VFR BLD AUTO: 36.4 % (ref 34–46.6)
HGB BLD-MCNC: 11.7 G/DL (ref 12–15.9)
HYALINE CASTS UR QL AUTO: ABNORMAL /LPF
MCH RBC QN AUTO: 25.9 PG (ref 26.6–33)
MCHC RBC AUTO-ENTMCNC: 32.1 G/DL (ref 31.5–35.7)
MCV RBC AUTO: 80.5 FL (ref 79–97)
NOROVIRUS GI+II RNA STL QL NAA+NON-PROBE: NOT DETECTED
P SHIGELLOIDES DNA STL QL NAA+PROBE: NOT DETECTED
PLATELET # BLD AUTO: 124 10*3/MM3 (ref 140–450)
PMV BLD AUTO: 11 FL (ref 6–12)
POTASSIUM BLD-SCNC: 3.6 MMOL/L (ref 3.5–5.2)
RBC # BLD AUTO: 4.52 10*6/MM3 (ref 3.77–5.28)
RBC # UR: ABNORMAL /HPF
REF LAB TEST METHOD: ABNORMAL
RV RNA STL NAA+PROBE: NOT DETECTED
SALMONELLA DNA SPEC QL NAA+PROBE: NOT DETECTED
SAPO I+II+IV+V RNA STL QL NAA+NON-PROBE: NOT DETECTED
SHIGELLA SP+EIEC IPAH STL QL NAA+PROBE: NOT DETECTED
SODIUM BLD-SCNC: 143 MMOL/L (ref 136–145)
SQUAMOUS #/AREA URNS HPF: ABNORMAL /HPF
V CHOLERAE DNA SPEC QL NAA+PROBE: NOT DETECTED
VIBRIO DNA SPEC NAA+PROBE: NOT DETECTED
WBC NRBC COR # BLD: 8.29 10*3/MM3 (ref 3.4–10.8)
WBC UR QL AUTO: ABNORMAL /HPF
YERSINIA STL CULT: NOT DETECTED

## 2020-01-15 PROCEDURE — 0097U HC BIOFIRE FILMARRAY GI PANEL: CPT | Performed by: INTERNAL MEDICINE

## 2020-01-15 PROCEDURE — 99254 IP/OBS CNSLTJ NEW/EST MOD 60: CPT | Performed by: INTERNAL MEDICINE

## 2020-01-15 PROCEDURE — 25010000002 MORPHINE PER 10 MG: Performed by: HOSPITALIST

## 2020-01-15 PROCEDURE — 85027 COMPLETE CBC AUTOMATED: CPT | Performed by: HOSPITALIST

## 2020-01-15 PROCEDURE — 80048 BASIC METABOLIC PNL TOTAL CA: CPT | Performed by: HOSPITALIST

## 2020-01-15 RX ORDER — SIMETHICONE 80 MG
80 TABLET,CHEWABLE ORAL 4 TIMES DAILY PRN
Status: DISCONTINUED | OUTPATIENT
Start: 2020-01-15 | End: 2020-01-17

## 2020-01-15 RX ORDER — ATORVASTATIN CALCIUM 20 MG/1
20 TABLET, FILM COATED ORAL NIGHTLY
Status: DISCONTINUED | OUTPATIENT
Start: 2020-01-15 | End: 2020-01-15

## 2020-01-15 RX ORDER — LEVOTHYROXINE SODIUM 0.07 MG/1
75 TABLET ORAL
Status: DISCONTINUED | OUTPATIENT
Start: 2020-01-15 | End: 2020-01-17 | Stop reason: HOSPADM

## 2020-01-15 RX ORDER — MORPHINE SULFATE 2 MG/ML
4 INJECTION, SOLUTION INTRAMUSCULAR; INTRAVENOUS ONCE
Status: DISCONTINUED | OUTPATIENT
Start: 2020-01-15 | End: 2020-01-15

## 2020-01-15 RX ORDER — TAMOXIFEN CITRATE 10 MG/1
20 TABLET ORAL NIGHTLY
Status: DISCONTINUED | OUTPATIENT
Start: 2020-01-15 | End: 2020-01-17 | Stop reason: HOSPADM

## 2020-01-15 RX ORDER — FAMOTIDINE 20 MG/1
20 TABLET, FILM COATED ORAL
Status: DISCONTINUED | OUTPATIENT
Start: 2020-01-15 | End: 2020-01-17 | Stop reason: HOSPADM

## 2020-01-15 RX ORDER — ONDANSETRON 2 MG/ML
4 INJECTION INTRAMUSCULAR; INTRAVENOUS ONCE
Status: DISCONTINUED | OUTPATIENT
Start: 2020-01-15 | End: 2020-01-15

## 2020-01-15 RX ORDER — ROPINIROLE 2 MG/1
2 TABLET, FILM COATED ORAL NIGHTLY
Status: DISCONTINUED | OUTPATIENT
Start: 2020-01-15 | End: 2020-01-17 | Stop reason: HOSPADM

## 2020-01-15 RX ORDER — SODIUM CHLORIDE AND POTASSIUM CHLORIDE 150; 450 MG/100ML; MG/100ML
75 INJECTION, SOLUTION INTRAVENOUS CONTINUOUS
Status: DISCONTINUED | OUTPATIENT
Start: 2020-01-15 | End: 2020-01-17

## 2020-01-15 RX ORDER — MORPHINE SULFATE 2 MG/ML
1 INJECTION, SOLUTION INTRAMUSCULAR; INTRAVENOUS EVERY 4 HOURS PRN
Status: DISCONTINUED | OUTPATIENT
Start: 2020-01-15 | End: 2020-01-17

## 2020-01-15 RX ORDER — ONDANSETRON 2 MG/ML
4 INJECTION INTRAMUSCULAR; INTRAVENOUS EVERY 4 HOURS PRN
Status: DISCONTINUED | OUTPATIENT
Start: 2020-01-15 | End: 2020-01-17

## 2020-01-15 RX ORDER — ATORVASTATIN CALCIUM 10 MG/1
10 TABLET, FILM COATED ORAL NIGHTLY
Status: DISCONTINUED | OUTPATIENT
Start: 2020-01-15 | End: 2020-01-17 | Stop reason: HOSPADM

## 2020-01-15 RX ADMIN — POTASSIUM CHLORIDE AND SODIUM CHLORIDE 75 ML/HR: 450; 150 INJECTION, SOLUTION INTRAVENOUS at 17:49

## 2020-01-15 RX ADMIN — POTASSIUM CHLORIDE AND SODIUM CHLORIDE 125 ML/HR: 450; 150 INJECTION, SOLUTION INTRAVENOUS at 08:08

## 2020-01-15 RX ADMIN — VENLAFAXINE HYDROCHLORIDE 225 MG: 150 CAPSULE, EXTENDED RELEASE ORAL at 14:22

## 2020-01-15 RX ADMIN — FAMOTIDINE 20 MG: 20 TABLET, FILM COATED ORAL at 06:39

## 2020-01-15 RX ADMIN — TAMOXIFEN CITRATE 20 MG: 10 TABLET, FILM COATED ORAL at 20:22

## 2020-01-15 RX ADMIN — MORPHINE SULFATE 1 MG: 2 INJECTION, SOLUTION INTRAMUSCULAR; INTRAVENOUS at 02:58

## 2020-01-15 RX ADMIN — ATORVASTATIN CALCIUM 10 MG: 10 TABLET, FILM COATED ORAL at 20:22

## 2020-01-15 RX ADMIN — MORPHINE SULFATE 1 MG: 2 INJECTION, SOLUTION INTRAMUSCULAR; INTRAVENOUS at 08:07

## 2020-01-15 RX ADMIN — FAMOTIDINE 20 MG: 20 TABLET, FILM COATED ORAL at 17:49

## 2020-01-15 RX ADMIN — ROPINIROLE 2 MG: 2 TABLET, FILM COATED ORAL at 20:22

## 2020-01-15 RX ADMIN — LEVOTHYROXINE SODIUM 75 MCG: 75 TABLET ORAL at 06:39

## 2020-01-15 RX ADMIN — MORPHINE SULFATE 1 MG: 2 INJECTION, SOLUTION INTRAMUSCULAR; INTRAVENOUS at 22:15

## 2020-01-15 RX ADMIN — SIMETHICONE CHEW TAB 80 MG 80 MG: 80 TABLET ORAL at 14:23

## 2020-01-15 NOTE — ED PROVIDER NOTES
" EMERGENCY DEPARTMENT ENCOUNTER    CHIEF COMPLAINT  Chief Complaint: diarrhea  History given by: pt  History limited by: nothing  Room Number: 12/12  PMD: Radha Sepulveda MD      HPI:  Pt is a 59 y.o. female with hx of gastroparesis, cholecystectomy, and breast cancer who presents complaining of intermittent, gradually worsening diarrhea for the past 5 days.  She has also been having nausea and abd pain and had an episode of vomiting PTA.  Pt states that she \"feels like she is going to explode and that she is belching a lot.\"  She denies fevers.  She denies recent illness exposures, abnormal foods, recent travel, and antibiotic use that could have caused her sx.      PAST MEDICAL HISTORY  Active Ambulatory Problems     Diagnosis Date Noted   • Chronic idiopathic constipation 05/01/2017   • Colon polyps 05/01/2017   • Gastroesophageal reflux disease without esophagitis 05/01/2017   • Gastroparesis 05/01/2017     Resolved Ambulatory Problems     Diagnosis Date Noted   • No Resolved Ambulatory Problems     Past Medical History:   Diagnosis Date   • GERD (gastroesophageal reflux disease)    • HX: breast cancer 2010   • Hyperlipidemia    • Internal hemorrhoid    • RLS (restless legs syndrome)    • Thyroid disease        PAST SURGICAL HISTORY  Past Surgical History:   Procedure Laterality Date   • BREAST LUMPECTOMY     • BREAST RECONSTRUCTION     • CHOLECYSTECTOMY     • COLONOSCOPY  2011    Per pt. IH, diverticulitis, otherwise normal (dr. Matias)   • COLONOSCOPY N/A 6/22/2016    polyps, tics, NBIH, hyperplastic polyp x 2   • UPPER GASTROINTESTINAL ENDOSCOPY  03/17/2017    multiple fundic gland polyps       FAMILY HISTORY  Family History   Problem Relation Age of Onset   • Colon polyps Sister    • Colon polyps Sister    • Colon polyps Sister    • Colon polyps Sister        SOCIAL HISTORY  Social History     Socioeconomic History   • Marital status:      Spouse name: Not on file   • Number of children: " Not on file   • Years of education: Not on file   • Highest education level: Not on file   Tobacco Use   • Smoking status: Never Smoker   • Smokeless tobacco: Never Used   Substance and Sexual Activity   • Alcohol use: Yes     Comment: occasssionally   • Drug use: No       ALLERGIES  Patient has no known allergies.    REVIEW OF SYSTEMS  Review of Systems   Constitutional: Negative for fever.   HENT: Negative for sore throat.    Eyes: Negative.    Respiratory: Negative for cough and shortness of breath.    Cardiovascular: Negative for chest pain.   Gastrointestinal: Positive for abdominal pain, diarrhea, nausea and vomiting.   Genitourinary: Negative for dysuria.   Musculoskeletal: Negative for neck pain.   Skin: Negative for rash.   Allergic/Immunologic: Negative.    Neurological: Negative for weakness, numbness and headaches.   Hematological: Negative.    Psychiatric/Behavioral: Negative.    All other systems reviewed and are negative.      PHYSICAL EXAM  ED Triage Vitals [01/14/20 2111]   Temp Heart Rate Resp BP SpO2   -- (!) 157 -- -- 99 %      Temp src Heart Rate Source Patient Position BP Location FiO2 (%)   -- -- -- -- --       Physical Exam   Constitutional: She is oriented to person, place, and time. No distress.   Appears uncomfortable   HENT:   Head: Normocephalic and atraumatic.   Mouth/Throat: Mucous membranes are dry.   Eyes: Pupils are equal, round, and reactive to light.   Pale conjunctiva   Neck: Normal range of motion.   Cardiovascular: Regular rhythm. Tachycardia present.   No murmur heard.     Pulmonary/Chest: Effort normal and breath sounds normal. No respiratory distress.   Abdominal: Soft. She exhibits distension. There is tenderness (diffuse). There is guarding. There is no rebound.   High-pitched bowel sounds   Musculoskeletal: Normal range of motion. She exhibits no edema.   no pedal edema or calf tenderness   Neurological: She is alert and oriented to person, place, and time. She has  normal sensation and normal strength.   No focal neurological deficits.   Skin: Skin is warm and dry.   Psychiatric: Affect normal.       LAB RESULTS  Lab Results (last 24 hours)     Procedure Component Value Units Date/Time    CBC & Differential [[id number]] Collected:  01/14/20 2130    Specimen:  Blood Updated:  01/14/20 2141    Narrative:       The following orders were created for panel order CBC & Differential.  Procedure                               Abnormality         Status                     ---------                               -----------         ------                     CBC Auto Differential[[id number]]        Abnormal            Final result                 Please view results for these tests on the individual orders.    Comprehensive Metabolic Panel [[id number]]  (Abnormal) Collected:  01/14/20 2130    Specimen:  Blood Updated:  01/14/20 2202     Glucose 162 mg/dL      BUN 19 mg/dL      Creatinine 1.25 mg/dL      Sodium 142 mmol/L      Potassium 3.5 mmol/L      Chloride 101 mmol/L      CO2 25.5 mmol/L      Calcium 10.3 mg/dL      Total Protein 8.6 g/dL      Albumin 4.80 g/dL      ALT (SGPT) 17 U/L      AST (SGOT) 19 U/L      Alkaline Phosphatase 64 U/L      Total Bilirubin 1.1 mg/dL      eGFR Non African Amer 44 mL/min/1.73      Globulin 3.8 gm/dL      A/G Ratio 1.3 g/dL      BUN/Creatinine Ratio 15.2     Anion Gap 15.5 mmol/L     Narrative:       GFR Normal >60  Chronic Kidney Disease <60  Kidney Failure <15      Lipase [[id number]]  (Normal) Collected:  01/14/20 2130    Specimen:  Blood Updated:  01/14/20 2202     Lipase 23 U/L     CBC Auto Differential [[id number]]  (Abnormal) Collected:  01/14/20 2130    Specimen:  Blood Updated:  01/14/20 2141     WBC 14.62 10*3/mm3      RBC 5.42 10*6/mm3      Hemoglobin 14.1 g/dL      Hematocrit 43.4 %      MCV 80.1 fL      MCH 26.0 pg      MCHC 32.5 g/dL      RDW 15.5 %      RDW-SD 45.1 fl      MPV 11.2 fL      Platelets 192 10*3/mm3      Neutrophil % 79.3 %   "    Lymphocyte % 12.1 %      Monocyte % 6.1 %      Eosinophil % 1.8 %      Basophil % 0.1 %      Immature Grans % 0.6 %      Neutrophils, Absolute 11.60 10*3/mm3      Lymphocytes, Absolute 1.77 10*3/mm3      Monocytes, Absolute 0.89 10*3/mm3      Eosinophils, Absolute 0.26 10*3/mm3      Basophils, Absolute 0.01 10*3/mm3      Immature Grans, Absolute 0.09 10*3/mm3      nRBC 0.0 /100 WBC     Procalcitonin [[id number]]  (Abnormal) Collected:  01/14/20 2130    Specimen:  Blood Updated:  01/14/20 2336     Procalcitonin 0.08 ng/mL     Narrative:       As a Marker for Sepsis (Non-Neonates):   1. <0.5 ng/mL represents a low risk of severe sepsis and/or septic shock.  1. >2 ng/mL represents a high risk of severe sepsis and/or septic shock.    As a Marker for Lower Respiratory Tract Infections that require antibiotic therapy:  PCT on Admission     Antibiotic Therapy             6-12 Hrs later  > 0.5                Strongly Recommended            >0.25 - <0.5         Recommended  0.1 - 0.25           Discouraged                   Remeasure/reassess PCT  <0.1                 Strongly Discouraged          Remeasure/reassess PCT      As 28 day mortality risk marker: \"Change in Procalcitonin Result\" (> 80 % or <=80 %) if Day 0 (or Day 1) and Day 4 values are available. Refer to http://www.[employer]-pct-calculator.com/   Change in PCT <=80 %   A decrease of PCT levels below or equal to 80 % defines a positive change in PCT test result representing a higher risk for 28-day all-cause mortality of patients diagnosed with severe sepsis or septic shock.  Change in PCT > 80 %   A decrease of PCT levels of more than 80 % defines a negative change in PCT result representing a lower risk for 28-day all-cause mortality of patients diagnosed with severe sepsis or septic shock.                Results may be falsely decreased if patient taking Biotin.     Blood Culture - Blood, Wrist, Left [[id number]] Collected:  01/14/20 2157    Specimen:  Blood " from Wrist, Left Updated:  01/14/20 2209    Lactic Acid, Plasma [[id number]]  (Normal) Collected:  01/14/20 2157    Specimen:  Blood Updated:  01/14/20 2226     Lactate 0.8 mmol/L     Blood Culture - Blood, Arm, Left [[id number]] Collected:  01/14/20 2245    Specimen:  Blood from Arm, Left Updated:  01/14/20 2300    Urinalysis With Microscopic If Indicated (No Culture) - Urine, Clean Catch [[id number]]  (Abnormal) Collected:  01/14/20 2255    Specimen:  Urine, Clean Catch Updated:  01/14/20 2327     Color, UA Yellow     Appearance, UA Clear     pH, UA <=5.0     Specific Gravity, UA >=1.030     Glucose, UA Negative     Ketones, UA Negative     Bilirubin, UA Negative     Blood, UA Trace     Protein, UA Negative     Leuk Esterase, UA Negative     Nitrite, UA Negative     Urobilinogen, UA 0.2 E.U./dL    Urinalysis, Microscopic Only - Urine, Clean Catch [[id number]] Collected:  01/14/20 2255    Specimen:  Urine, Clean Catch Updated:  01/14/20 2321          I ordered the above labs and reviewed the results    RADIOLOGY  CT Abdomen Pelvis With Contrast   Preliminary Result   IMPRESSION :    1. Homogeneous splenomegaly.   2. Mild small bowel dilatation with nonspecific air-fluid levels. For   reasons noted above, ileus or enteritis suspected over obstruction.   Suggest follow-up.   3. Anterior mediastinal lesion as discussed                           I ordered the above noted radiological studies. Interpreted by radiologist. Discussed with radiologist (Wiliam). Reviewed by me in PACS.       PROCEDURES  Procedures      PROGRESS AND CONSULTS      2126:  Ordered labs and CT a/p for further evaluation.  Ordered Dilaudid, Zofran, and IVF.      2139:  Ordered morphine for pain.      2147: ordered labs for further evaluation.      2300: I discussed with the patient and her  that her CT is most consistent with an enteritis and ileus.  However an early small bowel obstruction cannot be ruled out.  I discussed the case with   Aileenmed will admit the patient to the hospital for further evaluation and treatment    MEDICAL DECISION MAKING  Results were reviewed/discussed with the patient and they were also made aware of online access. Pt also made aware that some labs, such as cultures, will not be resulted during ER visit and follow up with PMD is necessary.     MDM  Number of Diagnoses or Management Options     Amount and/or Complexity of Data Reviewed  Clinical lab tests: reviewed and ordered  Tests in the radiology section of CPT®: ordered and reviewed  Independent visualization of images, tracings, or specimens: yes           DIAGNOSIS  Final diagnoses:   Enteritis   Ileus (CMS/HCC)   Generalized abdominal pain       DISPOSITION  Admission    Latest Documented Vital Signs:  As of 11:54 PM  BP- 139/92 HR- 106 Temp- 98.8 °F (37.1 °C) (Oral) O2 sat- 92%    --  Documentation assistance provided by jessica Arroyo for Dr. Warren MD.  Information recorded by the scribe was done at my direction and has been verified and validated by me.     Matias Arroyo  01/14/20 9723       Yadiel Kelley MD  01/14/20 2977

## 2020-01-15 NOTE — PLAN OF CARE
Problem: Patient Care Overview  Goal: Plan of Care Review  Outcome: Ongoing (interventions implemented as appropriate)   Ivf, passing gas, some diarrhea, med for c/o pain, consult w/dr santos, clear liquids

## 2020-01-15 NOTE — ED NOTES
Pt o2 saturation 88% on room air. Pt placed on 2L via nc.     Krista Hernandez RN  01/15/20 0038       Krista Hernandez RN  01/15/20 0039

## 2020-01-15 NOTE — ED NOTES
"\"  Nursing report ED to floor  Radha Moody  59 y.o.  female    HPI (triage note):   Chief Complaint   Patient presents with   • Abdominal Pain   • Vomiting   • Diarrhea       Admitting doctor:   Mustapha Khan MD    Admitting diagnosis:   The primary encounter diagnosis was Enteritis. Diagnoses of Ileus (CMS/HCC) and Generalized abdominal pain were also pertinent to this visit.    Code status:   Current Code Status     Date Active Code Status Order ID Comments User Context       Not on file          Allergies:   Patient has no known allergies.    Weight:       01/14/20 2126   Weight: 91.6 kg (202 lb)       Most recent vitals:   Vitals:    01/14/20 2127 01/14/20 2247 01/14/20 2300 01/14/20 2323   BP: 122/72 118/78 152/84 139/92   BP Location: Left arm Left arm  Left arm   Patient Position: Lying Lying  Lying   Pulse:  101  106   Resp:  16  18   Temp:       TempSrc:       SpO2: 93% 92%  92%   Weight:       Height:           Active LDAs/IV Access:   Lines, Drains & Airways    Active LDAs     Name:   Placement date:   Placement time:   Site:   Days:    Peripheral IV 01/14/20 2121 Left Antecubital   01/14/20 2121    Antecubital   less than 1                Labs (abnormal labs have a star):   Labs Reviewed   COMPREHENSIVE METABOLIC PANEL - Abnormal; Notable for the following components:       Result Value    Glucose 162 (*)     Creatinine 1.25 (*)     Total Protein 8.6 (*)     eGFR Non African Amer 44 (*)     Anion Gap 15.5 (*)     All other components within normal limits    Narrative:     GFR Normal >60  Chronic Kidney Disease <60  Kidney Failure <15     URINALYSIS W/ MICROSCOPIC IF INDICATED (NO CULTURE) - Abnormal; Notable for the following components:    Blood, UA Trace (*)     All other components within normal limits   CBC WITH AUTO DIFFERENTIAL - Abnormal; Notable for the following components:    WBC 14.62 (*)     RBC 5.42 (*)     MCH 26.0 (*)     RDW 15.5 (*)     Neutrophil % 79.3 (*)     Lymphocyte % 12.1 " "(*)     Immature Grans % 0.6 (*)     Neutrophils, Absolute 11.60 (*)     Immature Grans, Absolute 0.09 (*)     All other components within normal limits   PROCALCITONIN - Abnormal; Notable for the following components:    Procalcitonin 0.08 (*)     All other components within normal limits    Narrative:     As a Marker for Sepsis (Non-Neonates):   1. <0.5 ng/mL represents a low risk of severe sepsis and/or septic shock.  1. >2 ng/mL represents a high risk of severe sepsis and/or septic shock.    As a Marker for Lower Respiratory Tract Infections that require antibiotic therapy:  PCT on Admission     Antibiotic Therapy             6-12 Hrs later  > 0.5                Strongly Recommended            >0.25 - <0.5         Recommended  0.1 - 0.25           Discouraged                   Remeasure/reassess PCT  <0.1                 Strongly Discouraged          Remeasure/reassess PCT      As 28 day mortality risk marker: \"Change in Procalcitonin Result\" (> 80 % or <=80 %) if Day 0 (or Day 1) and Day 4 values are available. Refer to http://www.[employer] [facility]-pct-calculator.[place] Orders/   Change in PCT <=80 %   A decrease of PCT levels below or equal to 80 % defines a positive change in PCT test result representing a higher risk for 28-day all-cause mortality of patients diagnosed with severe sepsis or septic shock.  Change in PCT > 80 %   A decrease of PCT levels of more than 80 % defines a negative change in PCT result representing a lower risk for 28-day all-cause mortality of patients diagnosed with severe sepsis or septic shock.                Results may be falsely decreased if patient taking Biotin.    LIPASE - Normal   LACTIC ACID, PLASMA - Normal   BLOOD CULTURE   BLOOD CULTURE   URINALYSIS, MICROSCOPIC ONLY   CBC AND DIFFERENTIAL    Narrative:     The following orders were created for panel order CBC & Differential.  Procedure                               Abnormality         Status                     ---------                    "            -----------         ------                     CBC Auto Differential[[id number]]        Abnormal            Final result                 Please view results for these tests on the individual orders.       EKG:   No orders to display       Meds given in ED:   Medications   HYDROmorphone (DILAUDID) injection 0.5 mg (0.5 mg Intravenous Not Given 1/14/20 2138)   sodium chloride 0.9 % bolus 500 mL (500 mL Intravenous New Bag 1/14/20 2319)   sodium chloride 0.9 % infusion (125 mL/hr Intravenous New Bag 1/14/20 2322)   sodium chloride 0.9 % bolus 1,000 mL (0 mL Intravenous Stopped 1/14/20 2322)   ondansetron (ZOFRAN) injection 4 mg (4 mg Intravenous Given 1/14/20 2132)   morphine injection 4 mg (4 mg Intravenous Given 1/14/20 2143)   iopamidol (ISOVUE-300) 61 % injection 100 mL (85 mL Intravenous Given by Other 1/14/20 2224)       Imaging results:  Ct Abdomen Pelvis With Contrast    Result Date: 1/14/2020  IMPRESSION : 1. Homogeneous splenomegaly. 2. Mild small bowel dilatation with nonspecific air-fluid levels. For reasons noted above, ileus or enteritis suspected over obstruction. Suggest follow-up. 3. Anterior mediastinal lesion as discussed           Ambulatory status:   Ad reta    Social issues:   Social History     Socioeconomic History   • Marital status:      Spouse name: Not on file   • Number of children: Not on file   • Years of education: Not on file   • Highest education level: Not on file   Tobacco Use   • Smoking status: Never Smoker   • Smokeless tobacco: Never Used   Substance and Sexual Activity   • Alcohol use: Yes     Comment: occasssionally   • Drug use: No        Krista Hernandez, YARY  01/14/20 3778

## 2020-01-15 NOTE — ED NOTES
"Pt c/o nausea, vomiting and diarrhea that started on Friday. Pt says \"I just feel so bloated.\" pt denies pain but c/o discomfort. Pt reports taking more of her stool softener than usual.     Krista Hernandez RN  01/14/20 2122    "

## 2020-01-15 NOTE — H&P
History and physical    Primary care physician  Dr. Sepulveda    Chief complaint  Abdominal pain  Nausea vomiting diarrhea    History of present illness  59-year-old white female with history of breast cancer in the past restless leg syndrome hyperlipidemia hypothyroidism and gastroesophageal reflux disease presented to [facility] [place] emergency room with abdominal pain for last 5 days followed by nausea vomiting and diarrhea.  Patient unable to keep anything down.  Patient denies any fever chills.  Patient denies any black stools blood in the stools or blood in the vomitus.  Patient evaluated in ER found to have acute enteritis with possible ileus admit for management.    PAST MEDICAL HISTORY  • GERD (gastroesophageal reflux disease)     • HX: breast cancer 2010   • Hyperlipidemia     • Internal hemorrhoid     • RLS (restless legs syndrome)     • Thyroid disease        PAST SURGICAL HISTORY  Surgical History         Past Surgical History:   Procedure Laterality Date   • BREAST LUMPECTOMY       • BREAST RECONSTRUCTION       • CHOLECYSTECTOMY       • COLONOSCOPY   2011     Per pt. IH, diverticulitis, otherwise normal (dr. Matias)   • COLONOSCOPY N/A 6/22/2016     polyps, tics, NBIH, hyperplastic polyp x 2   • UPPER GASTROINTESTINAL ENDOSCOPY   03/17/2017     multiple fundic gland polyps         FAMILY HISTORY        Family History   Problem Relation Age of Onset   • Colon polyps Sister     • Colon polyps Sister     • Colon polyps Sister     • Colon polyps Sister        SOCIAL HISTORY  Social History   Social History            Socioeconomic History   • Marital status:        Spouse name: Not on file   • Number of children: Not on file   • Years of education: Not on file   • Highest education level: Not on file   Tobacco Use   • Smoking status: Never Smoker   • Smokeless tobacco: Never Used   Substance and Sexual Activity   • Alcohol use: Yes       Comment: occasssionally   • Drug use: No     "     ALLERGIES  Patient has no known allergies.  Home medications reviewed     REVIEW OF SYSTEMS  Constitutional: Negative for fever.   HENT: Negative for sore throat.    Eyes: Negative.    Respiratory: Negative for cough and shortness of breath.    Cardiovascular: Negative for chest pain.   Gastrointestinal: Positive for abdominal pain, diarrhea, nausea and vomiting.   Genitourinary: Negative for dysuria.   Musculoskeletal: Negative for neck pain.   Skin: Negative for rash.   Allergic/Immunologic: Negative.    Neurological: Negative for weakness, numbness and headaches.   Hematological: Negative.    Psychiatric/Behavioral: Negative.    All other systems reviewed and are negative.     PHYSICAL EXAM  Blood pressure 112/69, pulse 110, temperature 99.4 °F (37.4 °C), temperature source Oral, resp. rate 16, height 177.8 cm (70\"), weight 90.8 kg (200 lb 3.2 oz), SpO2 91 %.    Constitutional: She is oriented to person, place, and time. No distress.   Head: Normocephalic and atraumatic.   Mouth/Throat: Mucous membranes are dry.   Eyes: Pupils are equal, round, and reactive to light.   Neck: Normal range of motion.   Cardiovascular: Regular rhythm. Tachycardia present.   No murmur heard.  Pulmonary/Chest: Effort normal and breath sounds normal. No respiratory distress.   Abdominal: Soft. She exhibits distension. There is tenderness (diffuse). There is guarding. There is no rebound. High-pitched bowel sounds   Musculoskeletal: Normal range of motion. She exhibits no edema.   no pedal edema or calf tenderness   Neurological: She is alert and oriented to person, place, and time. She has normal sensation and normal strength. No focal neurological deficits.   Skin: Skin is warm and dry.   Psych normal mood behavior     LAB RESULTS  Lab Results (last 24 hours)     Procedure Component Value Units Date/Time    Gastrointestinal Panel, PCR - Stool, Per Rectum [[id number]] Collected:  01/15/20 1233    Specimen:  Stool from Per Rectum " Updated:  01/15/20 1233    Blood Culture - Blood, Arm, Left [[id number]] Collected:  01/14/20 2245    Specimen:  Blood from Arm, Left Updated:  01/15/20 1116     Blood Culture No growth at less than 24 hours    Blood Culture - Blood, Wrist, Left [[id number]] Collected:  01/14/20 2157    Specimen:  Blood from Wrist, Left Updated:  01/15/20 1103     Blood Culture No growth at less than 24 hours    Basic Metabolic Panel [[id number]]  (Abnormal) Collected:  01/15/20 0428    Specimen:  Blood from Hand, Left Updated:  01/15/20 0527     Glucose 131 mg/dL      BUN 17 mg/dL      Creatinine 0.88 mg/dL      Sodium 143 mmol/L      Potassium 3.6 mmol/L      Chloride 107 mmol/L      CO2 22.3 mmol/L      Calcium 8.8 mg/dL      eGFR Non African Amer 66 mL/min/1.73      BUN/Creatinine Ratio 19.3     Anion Gap 13.7 mmol/L     Narrative:       GFR Normal >60  Chronic Kidney Disease <60  Kidney Failure <15      CBC (No Diff) [[id number]]  (Abnormal) Collected:  01/15/20 0428    Specimen:  Blood from Hand, Left Updated:  01/15/20 0504     WBC 8.29 10*3/mm3      RBC 4.52 10*6/mm3      Hemoglobin 11.7 g/dL      Hematocrit 36.4 %      MCV 80.5 fL      MCH 25.9 pg      MCHC 32.1 g/dL      RDW 15.0 %      RDW-SD 44.2 fl      MPV 11.0 fL      Platelets 124 10*3/mm3     Urinalysis, Microscopic Only - Urine, Clean Catch [[id number]]  (Abnormal) Collected:  01/14/20 2255    Specimen:  Urine, Clean Catch Updated:  01/15/20 0006     RBC, UA 3-5 /HPF      WBC, UA 13-20 /HPF      Bacteria, UA 1+ /HPF      Squamous Epithelial Cells, UA 3-6 /HPF      Hyaline Casts, UA 13-20 /LPF      Methodology Manual Light Microscopy    Procalcitonin [[id number]]  (Abnormal) Collected:  01/14/20 2130    Specimen:  Blood Updated:  01/14/20 2336     Procalcitonin 0.08 ng/mL     Narrative:       As a Marker for Sepsis (Non-Neonates):   1. <0.5 ng/mL represents a low risk of severe sepsis and/or septic shock.  1. >2 ng/mL represents a high risk of severe sepsis and/or septic  "shock.    As a Marker for Lower Respiratory Tract Infections that require antibiotic therapy:  PCT on Admission     Antibiotic Therapy             6-12 Hrs later  > 0.5                Strongly Recommended            >0.25 - <0.5         Recommended  0.1 - 0.25           Discouraged                   Remeasure/reassess PCT  <0.1                 Strongly Discouraged          Remeasure/reassess PCT      As 28 day mortality risk marker: \"Change in Procalcitonin Result\" (> 80 % or <=80 %) if Day 0 (or Day 1) and Day 4 values are available. Refer to http://[email or web address]-pct-calculator.com/   Change in PCT <=80 %   A decrease of PCT levels below or equal to 80 % defines a positive change in PCT test result representing a higher risk for 28-day all-cause mortality of patients diagnosed with severe sepsis or septic shock.  Change in PCT > 80 %   A decrease of PCT levels of more than 80 % defines a negative change in PCT result representing a lower risk for 28-day all-cause mortality of patients diagnosed with severe sepsis or septic shock.                Results may be falsely decreased if patient taking Biotin.     Urinalysis With Microscopic If Indicated (No Culture) - Urine, Clean Catch [[id number]]  (Abnormal) Collected:  01/14/20 2255    Specimen:  Urine, Clean Catch Updated:  01/14/20 2327     Color, UA Yellow     Appearance, UA Clear     pH, UA <=5.0     Specific Gravity, UA >=1.030     Glucose, UA Negative     Ketones, UA Negative     Bilirubin, UA Negative     Blood, UA Trace     Protein, UA Negative     Leuk Esterase, UA Negative     Nitrite, UA Negative     Urobilinogen, UA 0.2 E.U./dL    Lactic Acid, Plasma [[id number]]  (Normal) Collected:  01/14/20 2157    Specimen:  Blood Updated:  01/14/20 2226     Lactate 0.8 mmol/L     Comprehensive Metabolic Panel [[id number]]  (Abnormal) Collected:  01/14/20 2130    Specimen:  Blood Updated:  01/14/20 2202     Glucose 162 mg/dL      BUN 19 mg/dL      Creatinine 1.25 mg/dL      " Sodium 142 mmol/L      Potassium 3.5 mmol/L      Chloride 101 mmol/L      CO2 25.5 mmol/L      Calcium 10.3 mg/dL      Total Protein 8.6 g/dL      Albumin 4.80 g/dL      ALT (SGPT) 17 U/L      AST (SGOT) 19 U/L      Alkaline Phosphatase 64 U/L      Total Bilirubin 1.1 mg/dL      eGFR Non African Amer 44 mL/min/1.73      Globulin 3.8 gm/dL      A/G Ratio 1.3 g/dL      BUN/Creatinine Ratio 15.2     Anion Gap 15.5 mmol/L     Narrative:       GFR Normal >60  Chronic Kidney Disease <60  Kidney Failure <15      Lipase [[id number]]  (Normal) Collected:  01/14/20 2130    Specimen:  Blood Updated:  01/14/20 2202     Lipase 23 U/L     CBC & Differential [[id number]] Collected:  01/14/20 2130    Specimen:  Blood Updated:  01/14/20 2141    Narrative:       The following orders were created for panel order CBC & Differential.  Procedure                               Abnormality         Status                     ---------                               -----------         ------                     CBC Auto Differential[[id number]]        Abnormal            Final result                 Please view results for these tests on the individual orders.    CBC Auto Differential [[id number]]  (Abnormal) Collected:  01/14/20 2130    Specimen:  Blood Updated:  01/14/20 2141     WBC 14.62 10*3/mm3      RBC 5.42 10*6/mm3      Hemoglobin 14.1 g/dL      Hematocrit 43.4 %      MCV 80.1 fL      MCH 26.0 pg      MCHC 32.5 g/dL      RDW 15.5 %      RDW-SD 45.1 fl      MPV 11.2 fL      Platelets 192 10*3/mm3      Neutrophil % 79.3 %      Lymphocyte % 12.1 %      Monocyte % 6.1 %      Eosinophil % 1.8 %      Basophil % 0.1 %      Immature Grans % 0.6 %      Neutrophils, Absolute 11.60 10*3/mm3      Lymphocytes, Absolute 1.77 10*3/mm3      Monocytes, Absolute 0.89 10*3/mm3      Eosinophils, Absolute 0.26 10*3/mm3      Basophils, Absolute 0.01 10*3/mm3      Immature Grans, Absolute 0.09 10*3/mm3      nRBC 0.0 /100 WBC         Imaging Results (Last 24  Hours)     Procedure Component Value Units Date/Time    CT Abdomen Pelvis With Contrast [[id number]] Collected:  01/14/20 2242     Updated:  01/15/20 0103    Narrative:       CT SCANS ABDOMEN AND PELVIS WITH IV CONTRAST.     HISTORY: n/v/d     COMPARISON: None.     TECHNIQUE: Radiation dose reduction techniques were utilized, including  automated exposure control and exposure modulation based on body size.  Axial images were obtained from the lung bases to the symphysis pubis  with IV contrast only.. Oral contrast was not administered per request.     FINDINGS : On sections obtained through the lower thorax, there is a  fluid density mass in the anterior mediastinum abutting the pericardial  surface which on image 12 measures 3.5 x 2.3 cm. It averages 19  Hounsfield units which borders on complex fluid. A pericardial cyst is  suspected, outpatient echocardiography follow-up recommended for further  evaluation and characterization however.     Prior cholecystectomy with minimal biliary dilation which is not  uncommon postcholecystectomy. Homogeneous splenomegaly at 15.2 cm.  Remaining solid organs have an unremarkable appearance. Normal aorta and  appendix.      There are multiple fluid-filled, mildly distended small bowel loops with  GI tract air-fluid levels. Maximum diameter is approximately 3.5 cm.  There are no associated inflammatory changes. Air is present throughout  the colon including the rectum which would mitigate against obstruction.  The small bowel findings may be related to generalized ileus or  enteritis.             Impression:       IMPRESSION :   1. Homogeneous splenomegaly.  2. Mild small bowel dilatation with nonspecific air-fluid levels. For  reasons noted above, ileus or enteritis suspected over obstruction.  Suggest follow-up.  3. Anterior mediastinal lesion as discussed        This report was finalized on 1/15/2020 1:00 AM by Derian Swain M.D.             Current Facility-Administered  Medications:   •  atorvastatin (LIPITOR) tablet 10 mg, 10 mg, Oral, Nightly, Martha Khan MD  •  famotidine (PEPCID) tablet 20 mg, 20 mg, Oral, BID AC, Martha Khan MD, 20 mg at 01/15/20 0639  •  levothyroxine (SYNTHROID, LEVOTHROID) tablet 75 mcg, 75 mcg, Oral, Q AM, Martha Khan MD, 75 mcg at 01/15/20 0639  •  morphine injection 1 mg, 1 mg, Intravenous, Q4H PRN, Martha Khan MD, 1 mg at 01/15/20 0807  •  ondansetron (ZOFRAN) injection 4 mg, 4 mg, Intravenous, Q4H PRN, Martha Khan MD  •  rOPINIRole (REQUIP) tablet 2 mg, 2 mg, Oral, Nightly, Martha Khan MD  •  simethicone (MYLICON) chewable tablet 80 mg, 80 mg, Oral, 4x Daily PRN, Danny Brown MD  •  sodium chloride 0.45 % with KCl 20 mEq/L infusion, 75 mL/hr, Intravenous, Continuous, Martha Khan MD, Last Rate: 125 mL/hr at 01/15/20 0808, 125 mL/hr at 01/15/20 0808  •  tamoxifen (NOLVADEX) tablet 20 mg, 20 mg, Oral, Nightly, Martha Khna MD  •  venlafaxine XR (EFFEXOR-XR) 24 hr capsule 225 mg, 225 mg, Oral, Nightly, Martha Khan MD     ASSESSMENT  Abdominal pain with nausea vomiting diarrhea consistent with acute gastroenteritis   Dehydration  Hypothyroidism   Hyperlipidemia  Restless leg syndrome  Gastroesophageal reflux disease    PLAN  Admit  IV fluid   Symptomatic treatment for abdominal pain nausea vomiting diarrhea  Check GI panel  GI consult  Continue home medications and adjust the doses  Stress ulcer DVT prophylaxis  Supportive care  Discussed with family  Patient is full code  Follow closely further recommendation current hospital course    MARTHA KHAN MD

## 2020-01-15 NOTE — ED NOTES
Pt to ED reports diarrhea for 5 days, then vomiting PTA, along with abd pain.     Elle Barreto, RN  01/14/20 0673

## 2020-01-15 NOTE — CONSULTS
[facility], [place], operated by [facility]  Initial Inpatient Consult Note    Referring Provider: Dr Khan    Reason for Consultation: Enteritis    Subjective     History of present illness:    59 y.o. female followed in our office by Dr Fenton.  She has hx of idiopathic gastroparesis.  She presented to ER last night c/o worsening diarrhea and abdominal distention.  Symptoms have been present for the last 3 to 4 days.  She reports stool is almost watery in nature.  She is been having a lot of belching with the symptoms.  She has been passing regular flatus as well.  She had a CT scan performed in the emergency room which showed some mildly dilated small loops of bowel compatible with enteritis or possible ileus.  The pattern was nonobstructive in nature.  She denies any recent sick contacts.  She denies any recent antibiotic exposure.  Mild leukocytosis on admission which has normalized on AM labs    Past Medical History:  Past Medical History:   Diagnosis Date   • Cancer (CMS/HCC)    • Elevated cholesterol    • Gastroparesis    • GERD (gastroesophageal reflux disease)    • HX: breast cancer 2010   • Hyperlipidemia    • Internal hemorrhoid    • RLS (restless legs syndrome)    • Sleep apnea    • Thyroid disease      Past Surgical History:  Past Surgical History:   Procedure Laterality Date   • BREAST LUMPECTOMY     • BREAST RECONSTRUCTION     • CHOLECYSTECTOMY     • COLONOSCOPY  2011    Per pt. IH, diverticulitis, otherwise normal (dr. Matias)   • COLONOSCOPY N/A 6/22/2016    polyps, tics, NBIH, hyperplastic polyp x 2   • UPPER GASTROINTESTINAL ENDOSCOPY  03/17/2017    multiple fundic gland polyps      Social History:   Social History     Tobacco Use   • Smoking status: Never Smoker   • Smokeless tobacco: Never Used   Substance Use Topics   • Alcohol use: Yes     Comment: occasssionally      Family History:  Family History   Problem Relation Age of Onset   • Colon polyps Sister    • Arthritis Sister    • Depression Sister     • Hyperlipidemia Sister    • Colon polyps Sister    • Depression Sister    • Hyperlipidemia Sister    • Colon polyps Sister    • Depression Sister    • Hyperlipidemia Sister    • Colon polyps Sister    • Depression Sister    • Hyperlipidemia Sister    • Depression Mother    • Alcohol abuse Father    • Arthritis Brother    • Asthma Son        Home Meds:  Medications Prior to Admission   Medication Sig Dispense Refill Last Dose   • atorvastatin (LIPITOR) 20 MG tablet Take 20 mg by mouth Every Night.   Taking   • dexlansoprazole (DEXILANT) 60 MG capsule Take 1 capsule by mouth Daily. (Patient taking differently: Take 60 mg by mouth Every Night.) 90 capsule 3    • glucose blood (EXACTECH TEST) test strip 1 strip by Other route Daily.   Taking   • levothyroxine (SYNTHROID, LEVOTHROID) 75 MCG tablet Take 88 mcg by mouth Every Night.   Taking   • linaclotide (LINZESS) 72 MCG capsule capsule Take 1 capsule by mouth Every Morning Before Breakfast. (Patient taking differently: Take 72 mcg by mouth Daily As Needed.) 90 capsule 3    • omega-3 acid ethyl esters (LOVAZA) 1 g capsule Take 4 g by mouth Every Night.   Taking   • rOPINIRole (REQUIP) 2 MG tablet Take 2 mg by mouth Every Night.   Taking   • Semaglutide (OZEMPIC, 0.25 OR 0.5 MG/DOSE, SC) Inject 0.5 mg under the skin into the appropriate area as directed 1 (One) Time Per Week. takes on sunday      • tamoxifen (NOLVADEX) 20 MG chemo tablet Take 20 mg by mouth Every Night.   Taking   • venlafaxine XR (EFFEXOR-XR) 150 MG 24 hr capsule Take 225 mg by mouth Every Night.   Taking   • naproxen (NAPROSYN) 500 MG tablet Take 1 tablet by mouth 2 (Two) Times a Day As Needed for Mild Pain (1-3). 15 tablet 0 Taking   • SITagliptin (JANUVIA) 100 MG tablet Take 100 mg by mouth Daily.   Taking   • triamcinolone (KENALOG) 0.5 % cream Apply  topically to the appropriate area as directed 3 (Three) Times a Day. To affected area for contact dermatitis 30 g 0 Taking     Current Meds:          famotidine 20 mg Oral BID AC   HYDROmorphone 0.5 mg Intravenous Once   levothyroxine 75 mcg Oral Q AM     Allergies:  No Known Allergies  Review of Systems  All systems were reviewed and negative except for:  Gastrointestinal: positive for  bloating / distention, diarrhea, nausea and pain     Objective     Vital Signs  Temp:  [98.2 °F (36.8 °C)-99.4 °F (37.4 °C)] 99.4 °F (37.4 °C)  Heart Rate:  [101-157] 110  Resp:  [16-20] 16  BP: (107-152)/(66-92) 112/69  Physical Exam:  General Appearance:    Alert, cooperative, in no acute distress   Head:    Normocephalic, without obvious abnormality, atraumatic   Eyes:            Lids and lashes normal, conjunctivae and sclerae normal, no   icterus   Throat:   No oral lesions, no thrush, oral mucosa moist   Neck:   No adenopathy, supple, trachea midline, no thyromegaly, no   carotid bruit, no JVD   Lungs:     Clear to auscultation,respirations regular, even and                   unlabored    Heart:    Regular rhythm and normal rate, normal S1 and S2, no            murmur, no gallop, no rub, no click   Chest Wall:    No abnormalities observed   Abdomen:     Mild distention but overall soft, TTP without guarding or rebound,    Rectal:     Deferred   Extremities:   no edema, no cyanosis, no redness   Skin:   No bleeding, bruising or rash   Lymph nodes:   No palpable adenopathy   Psychiatric:  Judgement and insight: normal   Orientation to person place and time: normal   Mood and affect: normal   Results Review:   I reviewed the patient's new clinical results.  I reviewed the patient's new imaging results and agree with the interpretation.    Results from last 7 days   Lab Units 01/15/20  0428 01/14/20  2130   WBC 10*3/mm3 8.29 14.62*   HEMOGLOBIN g/dL 11.7* 14.1   HEMATOCRIT % 36.4 43.4   PLATELETS 10*3/mm3 124* 192     Results from last 7 days   Lab Units 01/15/20  0428 01/14/20  2130   SODIUM mmol/L 143 142   POTASSIUM mmol/L 3.6 3.5   CHLORIDE mmol/L 107 101   CO2 mmol/L  22.3 25.5   BUN mg/dL 17 19   CREATININE mg/dL 0.88 1.25*   CALCIUM mg/dL 8.8 10.3   BILIRUBIN mg/dL  --  1.1   ALK PHOS U/L  --  64   ALT (SGPT) U/L  --  17   AST (SGOT) U/L  --  19   GLUCOSE mg/dL 131* 162*         Lab Results   Lab Value Date/Time    LIPASE 23 01/14/2020 2130    LIPASE 27 11/15/2016 1408       Radiology:  CT Abdomen Pelvis With Contrast   Final Result   IMPRESSION :    1. Homogeneous splenomegaly.   2. Mild small bowel dilatation with nonspecific air-fluid levels. For   reasons noted above, ileus or enteritis suspected over obstruction.   Suggest follow-up.   3. Anterior mediastinal lesion as discussed           This report was finalized on 1/15/2020 1:00 AM by Derian Swain M.D.              Assessment/Plan   Assessment:   1.  Abdominal pain  2.  Diarrhea  3.  Abnormal Ct scan of small bowel - suspect enteritis  4.  Gastroparesis    Plan:   Suspect infectious enteritis as etiology of patient's symptoms and CT findings.  Will check GI PCR  Ok for clear liquid diet and advancement as tolerated  Start simethicone    I discussed the patients findings and my recommendations with patient.         Danny Brown M.D.  [facility] [place] [facility]  [address] [place] [address]  Office: [phone]

## 2020-01-16 LAB
ALBUMIN SERPL-MCNC: 3.2 G/DL (ref 3.5–5.2)
ALBUMIN/GLOB SERPL: 1.2 G/DL
ALP SERPL-CCNC: 44 U/L (ref 39–117)
ALT SERPL W P-5'-P-CCNC: 13 U/L (ref 1–33)
ANION GAP SERPL CALCULATED.3IONS-SCNC: 9.6 MMOL/L (ref 5–15)
AST SERPL-CCNC: 13 U/L (ref 1–32)
BACTERIA UR QL AUTO: ABNORMAL /HPF
BASOPHILS # BLD AUTO: 0.01 10*3/MM3 (ref 0–0.2)
BASOPHILS NFR BLD AUTO: 0.2 % (ref 0–1.5)
BILIRUB SERPL-MCNC: 1.4 MG/DL (ref 0.2–1.2)
BILIRUB UR QL STRIP: NEGATIVE
BUN BLD-MCNC: 13 MG/DL (ref 6–20)
BUN/CREAT SERPL: 14.8 (ref 7–25)
CALCIUM SPEC-SCNC: 8.1 MG/DL (ref 8.6–10.5)
CHLORIDE SERPL-SCNC: 109 MMOL/L (ref 98–107)
CHOLEST SERPL-MCNC: 132 MG/DL (ref 0–200)
CLARITY UR: ABNORMAL
CO2 SERPL-SCNC: 24.4 MMOL/L (ref 22–29)
COLOR UR: YELLOW
CREAT BLD-MCNC: 0.88 MG/DL (ref 0.57–1)
DEPRECATED RDW RBC AUTO: 43.9 FL (ref 37–54)
EOSINOPHIL # BLD AUTO: 0.14 10*3/MM3 (ref 0–0.4)
EOSINOPHIL NFR BLD AUTO: 3.1 % (ref 0.3–6.2)
ERYTHROCYTE [DISTWIDTH] IN BLOOD BY AUTOMATED COUNT: 14.8 % (ref 12.3–15.4)
GFR SERPL CREATININE-BSD FRML MDRD: 66 ML/MIN/1.73
GLOBULIN UR ELPH-MCNC: 2.7 GM/DL
GLUCOSE BLD-MCNC: 112 MG/DL (ref 65–99)
GLUCOSE UR STRIP-MCNC: NEGATIVE MG/DL
HBA1C MFR BLD: 6.1 % (ref 4.8–5.6)
HCT VFR BLD AUTO: 29.7 % (ref 34–46.6)
HDLC SERPL-MCNC: 49 MG/DL (ref 40–60)
HGB BLD-MCNC: 9.8 G/DL (ref 12–15.9)
HGB UR QL STRIP.AUTO: ABNORMAL
HYALINE CASTS UR QL AUTO: ABNORMAL /LPF
KETONES UR QL STRIP: NEGATIVE
LDLC SERPL CALC-MCNC: 62 MG/DL (ref 0–100)
LDLC/HDLC SERPL: 1.27 {RATIO}
LEUKOCYTE ESTERASE UR QL STRIP.AUTO: ABNORMAL
LYMPHOCYTES # BLD AUTO: 0.89 10*3/MM3 (ref 0.7–3.1)
LYMPHOCYTES NFR BLD AUTO: 19.8 % (ref 19.6–45.3)
MCH RBC QN AUTO: 27.1 PG (ref 26.6–33)
MCHC RBC AUTO-ENTMCNC: 33 G/DL (ref 31.5–35.7)
MCV RBC AUTO: 82.3 FL (ref 79–97)
MONOCYTES # BLD AUTO: 0.45 10*3/MM3 (ref 0.1–0.9)
MONOCYTES NFR BLD AUTO: 10 % (ref 5–12)
NEUTROPHILS # BLD AUTO: 2.98 10*3/MM3 (ref 1.7–7)
NEUTROPHILS NFR BLD AUTO: 66.5 % (ref 42.7–76)
NITRITE UR QL STRIP: POSITIVE
NT-PROBNP SERPL-MCNC: 72.3 PG/ML (ref 5–900)
PH UR STRIP.AUTO: <=5 [PH] (ref 5–8)
PLATELET # BLD AUTO: 101 10*3/MM3 (ref 140–450)
PMV BLD AUTO: 10.9 FL (ref 6–12)
POTASSIUM BLD-SCNC: 3.8 MMOL/L (ref 3.5–5.2)
PROT SERPL-MCNC: 5.9 G/DL (ref 6–8.5)
PROT UR QL STRIP: NEGATIVE
RBC # BLD AUTO: 3.61 10*6/MM3 (ref 3.77–5.28)
RBC # UR: ABNORMAL /HPF
REF LAB TEST METHOD: ABNORMAL
SODIUM BLD-SCNC: 143 MMOL/L (ref 136–145)
SP GR UR STRIP: 1.02 (ref 1–1.03)
SQUAMOUS #/AREA URNS HPF: ABNORMAL /HPF
TRIGL SERPL-MCNC: 105 MG/DL (ref 0–150)
TSH SERPL DL<=0.05 MIU/L-ACNC: 2.52 UIU/ML (ref 0.27–4.2)
UROBILINOGEN UR QL STRIP: ABNORMAL
VLDLC SERPL-MCNC: 21 MG/DL (ref 5–40)
WBC NRBC COR # BLD: 4.49 10*3/MM3 (ref 3.4–10.8)
WBC UR QL AUTO: ABNORMAL /HPF

## 2020-01-16 PROCEDURE — 85025 COMPLETE CBC W/AUTO DIFF WBC: CPT | Performed by: HOSPITALIST

## 2020-01-16 PROCEDURE — 84443 ASSAY THYROID STIM HORMONE: CPT | Performed by: HOSPITALIST

## 2020-01-16 PROCEDURE — 80061 LIPID PANEL: CPT | Performed by: HOSPITALIST

## 2020-01-16 PROCEDURE — 81001 URINALYSIS AUTO W/SCOPE: CPT | Performed by: INTERNAL MEDICINE

## 2020-01-16 PROCEDURE — 83036 HEMOGLOBIN GLYCOSYLATED A1C: CPT | Performed by: HOSPITALIST

## 2020-01-16 PROCEDURE — 83880 ASSAY OF NATRIURETIC PEPTIDE: CPT | Performed by: HOSPITALIST

## 2020-01-16 PROCEDURE — 87186 SC STD MICRODIL/AGAR DIL: CPT | Performed by: INTERNAL MEDICINE

## 2020-01-16 PROCEDURE — 80053 COMPREHEN METABOLIC PANEL: CPT | Performed by: HOSPITALIST

## 2020-01-16 PROCEDURE — 99232 SBSQ HOSP IP/OBS MODERATE 35: CPT | Performed by: INTERNAL MEDICINE

## 2020-01-16 PROCEDURE — 87088 URINE BACTERIA CULTURE: CPT | Performed by: INTERNAL MEDICINE

## 2020-01-16 PROCEDURE — 87086 URINE CULTURE/COLONY COUNT: CPT | Performed by: INTERNAL MEDICINE

## 2020-01-16 RX ORDER — SUCRALFATE 1 G/1
1 TABLET ORAL
Status: DISCONTINUED | OUTPATIENT
Start: 2020-01-16 | End: 2020-01-17 | Stop reason: HOSPADM

## 2020-01-16 RX ADMIN — SUCRALFATE 1 G: 1 TABLET ORAL at 20:43

## 2020-01-16 RX ADMIN — ATORVASTATIN CALCIUM 10 MG: 10 TABLET, FILM COATED ORAL at 20:43

## 2020-01-16 RX ADMIN — FAMOTIDINE 20 MG: 20 TABLET, FILM COATED ORAL at 17:01

## 2020-01-16 RX ADMIN — SUCRALFATE 1 G: 1 TABLET ORAL at 17:01

## 2020-01-16 RX ADMIN — LEVOTHYROXINE SODIUM 75 MCG: 75 TABLET ORAL at 06:38

## 2020-01-16 RX ADMIN — VENLAFAXINE HYDROCHLORIDE 225 MG: 150 CAPSULE, EXTENDED RELEASE ORAL at 20:43

## 2020-01-16 RX ADMIN — POTASSIUM CHLORIDE AND SODIUM CHLORIDE 75 ML/HR: 450; 150 INJECTION, SOLUTION INTRAVENOUS at 09:36

## 2020-01-16 RX ADMIN — ROPINIROLE 2 MG: 2 TABLET, FILM COATED ORAL at 20:43

## 2020-01-16 RX ADMIN — SUCRALFATE 1 G: 1 TABLET ORAL at 12:10

## 2020-01-16 RX ADMIN — FAMOTIDINE 20 MG: 20 TABLET, FILM COATED ORAL at 06:38

## 2020-01-16 RX ADMIN — TAMOXIFEN CITRATE 20 MG: 10 TABLET, FILM COATED ORAL at 20:43

## 2020-01-16 NOTE — PLAN OF CARE
Problem: Patient Care Overview  Goal: Plan of Care Review  Outcome: Ongoing (interventions implemented as appropriate)  Flowsheets (Taken 1/16/2020 6086)  Progress: improving  Plan of Care Reviewed With: patient  Outcome Summary: ivf's, c/o intermittent upper abd discomfort, started Carafate, uses O2 per [place] QHS for sleep apnea, ambulates in room, up ad reta, voids well, UA sent today, VSS

## 2020-01-16 NOTE — PROGRESS NOTES
Discharge Planning Assessment  [facility]     Patient Name: Radha Moody  MRN: [id number]  Today's Date: 1/16/2020    Admit Date: 1/14/2020    Discharge Needs Assessment     Row Name 01/16/20 1645       Living Environment    Lives With  spouse    Name(s) of Who Lives With Patient  -- : Chelle    Current Living Arrangements  home/apartment/condo    Primary Care Provided by  self    Provides Primary Care For  no one    Family Caregiver if Needed  child(gurjit), adult;spouse    Family Caregiver Names  -- : Chelle and daughter Wendy    Quality of Family Relationships  supportive    Able to Return to Prior Arrangements  yes       Resource/Environmental Concerns    Resource/Environmental Concerns  none    Transportation Concerns  car, none       Transition Planning    Patient/Family Anticipates Transition to  home with family    Patient/Family Anticipated Services at Transition  none    Transportation Anticipated  family or friend will provide       Discharge Needs Assessment    Readmission Within the Last 30 Days  no previous admission in last 30 days    Concerns to be Addressed  no discharge needs identified;denies needs/concerns at this time;adjustment to diagnosis/illness    Equipment Currently Used at Home  none    Anticipated Changes Related to Illness  none    Equipment Needed After Discharge  none        Discharge Plan     Row Name 01/16/20 1640       Plan    Plan  Home w/o needs    Provided post acute provider list?  Yes    Post Acute Provider List  Home Health;Nursing Home    Delivered To  Patient    Method of Delivery  In person    Plan Comments  Pt confirmed the address, PCP and pharmacy are correct. Pt said she is IADL, uses no DME, can afford her Rx, had never had home health and plans to return home tomorrow and doses not anticipate any needs.  Pt said her dtr Wendy is a nurse for [employer] and will be able to assist if need as well as her  Chelle.  Pt denies having POA / HCS.       Марина  Jayden PRINGLE     Demographic Summary     Row Name 01/16/20 1644       General Information    Admission Type  inpatient    Arrived From  home    Referral Source  admission list    Reason for Consult  discharge planning    Preferred Language  English     Used During This Interaction  no       Contact Information    Permission Granted to Share Info With  family/designee        Functional Status     Row Name 01/16/20 1644       Functional Status, IADL    Medications  independent    Meal Preparation  independent    Housekeeping  independent    Laundry  independent    Shopping  independent       Employment/    Current or Previous Occupation  other (see comments) Works in office for [employer]     Patient Forms     Row Name 01/16/20 1646       Patient Forms    Provider Choice List  Delivered    Delivered to  Patient    Method of delivery  In person            Марина Carpenter, RN

## 2020-01-16 NOTE — PLAN OF CARE
Problem: Patient Care Overview  Goal: Plan of Care Review  Outcome: Ongoing (interventions implemented as appropriate)  Flowsheets (Taken 1/16/2020 0429)  Progress: improving  Plan of Care Reviewed With: patient  Outcome Summary: Afebrile and VS stable. C/O episode before bed of pain in mid abdominal area--radiating to left upper abdomen. Medicated x1 with morphine. No further c/o voiced and no report of nausea. States she has been having some diarrhea but states its much less now.

## 2020-01-16 NOTE — PROGRESS NOTES
"Daily progress note    Chief complaint  Doing little better   Still with nausea but no vomiting diarrhea  Mild abdominal discomfort    History of present illness  59-year-old white female with history of breast cancer in the past restless leg syndrome hyperlipidemia hypothyroidism and gastroesophageal reflux disease presented to [facility] emergency room with abdominal pain for last 5 days followed by nausea vomiting and diarrhea.  Patient unable to keep anything down.  Patient denies any fever chills.  Patient denies any black stools blood in the stools or blood in the vomitus.  Patient evaluated in ER found to have acute enteritis with possible ileus admit for management.     REVIEW OF SYSTEMS  Constitutional: Negative for fever.   HENT: Negative for sore throat.    Eyes: Negative.    Respiratory: Negative for cough and shortness of breath.    Cardiovascular: Negative for chest pain.   Gastrointestinal: Positive for abdominal pain, diarrhea, nausea and vomiting.   Genitourinary: Negative for dysuria.   Musculoskeletal: Negative for neck pain.   Skin: Negative for rash.   Allergic/Immunologic: Negative.    Neurological: Negative for weakness, numbness and headaches.   Hematological: Negative.    Psychiatric/Behavioral: Negative.    All other systems reviewed and are negative.     PHYSICAL EXAM  Blood pressure 103/63, pulse 91, temperature 97.3 °F (36.3 °C), temperature source Oral, resp. rate 18, height 177.8 cm (70\"), weight 90.8 kg (200 lb 3.2 oz), SpO2 91 %.    Constitutional: She is oriented to person, place, and time. No distress.   Head: Normocephalic and atraumatic.   Mouth/Throat: Mucous membranes are dry.   Eyes: Pupils are equal, round, and reactive to light.   Neck: Normal range of motion.   Cardiovascular: Regular rhythm. Tachycardia present.   No murmur heard.  Pulmonary/Chest: Effort normal and breath sounds normal. No respiratory distress.   Abdominal: Soft. She exhibits distension. There " is tenderness (diffuse). There is guarding. There is no rebound. High-pitched bowel sounds   Musculoskeletal: Normal range of motion. She exhibits no edema.   no pedal edema or calf tenderness   Neurological: She is alert and oriented to person, place, and time. She has normal sensation and normal strength. No focal neurological deficits.   Skin: Skin is warm and dry.   Psych normal mood behavior     LAB RESULTS  Lab Results (last 24 hours)     Procedure Component Value Units Date/Time    Urinalysis With Culture If Indicated - Urine, Clean Catch [[id number]] Collected:  01/16/20 1432    Specimen:  Urine, Clean Catch Updated:  01/16/20 1437    CBC & Differential [[id number]] Collected:  01/16/20 0458    Specimen:  Blood from Hand, Left Updated:  01/16/20 0559    Narrative:       The following orders were created for panel order CBC & Differential.  Procedure                               Abnormality         Status                     ---------                               -----------         ------                     CBC Auto Differential[[id number]]        Abnormal            Final result                 Please view results for these tests on the individual orders.    CBC Auto Differential [[id number]]  (Abnormal) Collected:  01/16/20 0458    Specimen:  Blood from Hand, Left Updated:  01/16/20 0559     WBC 4.49 10*3/mm3      RBC 3.61 10*6/mm3      Hemoglobin 9.8 g/dL      Hematocrit 29.7 %      MCV 82.3 fL      MCH 27.1 pg      MCHC 33.0 g/dL      RDW 14.8 %      RDW-SD 43.9 fl      MPV 10.9 fL      Platelets 101 10*3/mm3      Neutrophil % 66.5 %      Lymphocyte % 19.8 %      Monocyte % 10.0 %      Eosinophil % 3.1 %      Basophil % 0.2 %      Neutrophils, Absolute 2.98 10*3/mm3      Lymphocytes, Absolute 0.89 10*3/mm3      Monocytes, Absolute 0.45 10*3/mm3      Eosinophils, Absolute 0.14 10*3/mm3      Basophils, Absolute 0.01 10*3/mm3     Comprehensive Metabolic Panel [[id number]]  (Abnormal) Collected:  01/16/20 0458     Specimen:  Blood from Hand, Left Updated:  01/16/20 0555     Glucose 112 mg/dL      BUN 13 mg/dL      Creatinine 0.88 mg/dL      Sodium 143 mmol/L      Potassium 3.8 mmol/L      Chloride 109 mmol/L      CO2 24.4 mmol/L      Calcium 8.1 mg/dL      Total Protein 5.9 g/dL      Albumin 3.20 g/dL      ALT (SGPT) 13 U/L      AST (SGOT) 13 U/L      Alkaline Phosphatase 44 U/L      Total Bilirubin 1.4 mg/dL      eGFR Non African Amer 66 mL/min/1.73      Globulin 2.7 gm/dL      A/G Ratio 1.2 g/dL      BUN/Creatinine Ratio 14.8     Anion Gap 9.6 mmol/L     Narrative:       GFR Normal >60  Chronic Kidney Disease <60  Kidney Failure <15      TSH [[id number]]  (Normal) Collected:  01/16/20 0458    Specimen:  Blood from Hand, Left Updated:  01/16/20 0555     TSH 2.520 uIU/mL     BNP [[id number]]  (Normal) Collected:  01/16/20 0458    Specimen:  Blood from Hand, Left Updated:  01/16/20 0555     proBNP 72.3 pg/mL     Narrative:       Among patients with dyspnea, NT-proBNP is highly sensitive for the detection of acute congestive heart failure. In addition NT-proBNP of <300 pg/ml effectively rules out acute congestive heart failure with 99% negative predictive value.    Results may be falsely decreased if patient taking Biotin.      Lipid Panel [[id number]] Collected:  01/16/20 0458    Specimen:  Blood from Hand, Left Updated:  01/16/20 0548     Total Cholesterol 132 mg/dL      Triglycerides 105 mg/dL      HDL Cholesterol 49 mg/dL      LDL Cholesterol  62 mg/dL      VLDL Cholesterol 21 mg/dL      LDL/HDL Ratio 1.27    Narrative:       Cholesterol Reference Ranges  (U.S. Department of Health and Human Services ATP III Classifications)    Desirable          <200 mg/dL  Borderline High    200-239 mg/dL  High Risk          >240 mg/dL      Triglyceride Reference Ranges  (U.S. Department of Health and Human Services ATP III Classifications)    Normal           <150 mg/dL  Borderline High  150-199 mg/dL  High             200-499  mg/dL  Very High        >500 mg/dL    HDL Reference Ranges  (U.S. Department of Health and Human Services ATP III Classifcations)    Low     <40 mg/dl (major risk factor for CHD)  High    >60 mg/dl ('negative' risk factor for CHD)        LDL Reference Ranges  (U.S. Department of Health and Human Services ATP III Classifcations)    Optimal          <100 mg/dL  Near Optimal     100-129 mg/dL  Borderline High  130-159 mg/dL  High             160-189 mg/dL  Very High        >189 mg/dL    Hemoglobin A1c [[id number]]  (Abnormal) Collected:  01/16/20 0458    Specimen:  Blood from Hand, Left Updated:  01/16/20 0538     Hemoglobin A1C 6.10 %     Narrative:       Hemoglobin A1C Ranges:    Increased Risk for Diabetes  5.7% to 6.4%  Diabetes                     >= 6.5%  Diabetic Goal                < 7.0%    Blood Culture - Blood, Arm, Left [[id number]] Collected:  01/14/20 2245    Specimen:  Blood from Arm, Left Updated:  01/15/20 2301     Blood Culture No growth at 24 hours    Blood Culture - Blood, Wrist, Left [[id number]] Collected:  01/14/20 2157    Specimen:  Blood from Wrist, Left Updated:  01/15/20 2215     Blood Culture No growth at 24 hours    Gastrointestinal Panel, PCR - Stool, Per Rectum [[id number]]  (Normal) Collected:  01/15/20 1233    Specimen:  Stool from Per Rectum Updated:  01/15/20 1513     Campylobacter Not Detected     Plesiomonas shigelloides Not Detected     Salmonella Not Detected     Vibrio Not Detected     Vibrio cholerae Not Detected     Yersinia enterocolitica Not Detected     Enteroaggregative E. coli (EAEC) Not Detected     Enteropathogenic E. coli (EPEC) Not Detected     Enterotoxigenic E. coli (ETEC) lt/st Not Detected     Shiga-like toxin-producing E. coli (STEC) stx1/stx2 Not Detected     E. coli O157 Not Detected     Shigella/Enteroinvasive E. coli (EIEC) Not Detected     Cryptosporidium Not Detected     Cyclospora cayetanensis Not Detected     Entamoeba histolytica Not Detected     Giardia  lamblia Not Detected     Adenovirus F40/41 Not Detected     Astrovirus Not Detected     Norovirus GI/GII Not Detected     Rotavirus A Not Detected     Sapovirus (I, II, IV or V) Not Detected    Narrative:       If Aeromonas, Staphylococcus aureus or Bacillus cereus are suspected, please order [id number]: Stool Culture, Aeromonas, S aureus, B Cereus.        Imaging Results (Last 24 Hours)     ** No results found for the last 24 hours. **          Current Facility-Administered Medications:   •  atorvastatin (LIPITOR) tablet 10 mg, 10 mg, Oral, Nightly, Mustapha Khan MD, 10 mg at 01/15/20 2022  •  famotidine (PEPCID) tablet 20 mg, 20 mg, Oral, BID AC, Mustapha Khan MD, 20 mg at 01/16/20 0638  •  levothyroxine (SYNTHROID, LEVOTHROID) tablet 75 mcg, 75 mcg, Oral, Q AM, Mustapha Khan MD, 75 mcg at 01/16/20 0638  •  morphine injection 1 mg, 1 mg, Intravenous, Q4H PRN, Mustapha Khan MD, 1 mg at 01/15/20 2215  •  ondansetron (ZOFRAN) injection 4 mg, 4 mg, Intravenous, Q4H PRN, Mustapha Khan MD  •  rOPINIRole (REQUIP) tablet 2 mg, 2 mg, Oral, Nightly, Mustapha Khan MD, 2 mg at 01/15/20 2022  •  simethicone (MYLICON) chewable tablet 80 mg, 80 mg, Oral, 4x Daily PRN, Danny Brown MD, 80 mg at 01/15/20 1423  •  sodium chloride 0.45 % with KCl 20 mEq/L infusion, 75 mL/hr, Intravenous, Continuous, Mustapha Khan MD, Last Rate: 75 mL/hr at 01/16/20 0936, 75 mL/hr at 01/16/20 0936  •  sucralfate (CARAFATE) tablet 1 g, 1 g, Oral, 4x Daily AC & at Bedtime, Karolina Fenton MD, 1 g at 01/16/20 1210  •  tamoxifen (NOLVADEX) tablet 20 mg, 20 mg, Oral, Nightly, Mustapha Khan MD, 20 mg at 01/15/20 2022  •  venlafaxine XR (EFFEXOR-XR) 24 hr capsule 225 mg, 225 mg, Oral, Nightly, Mustapha Khan MD, 225 mg at 01/15/20 1422     ASSESSMENT  Abdominal pain with nausea vomiting diarrhea consistent with acute gastroenteritis   Dehydration  Hypothyroidism   Hyperlipidemia  Restless leg syndrome  Gastroesophageal reflux  disease    PLAN  CPM  IV fluid   Symptomatic treatment for abdominal pain nausea vomiting diarrhea  GI consult appreciated  Continue home medications and adjust the doses  Stress ulcer DVT prophylaxis  Supportive care  Discussed with family  Follow closely further recommendation current hospital course    MARTHA SUAREZ MD

## 2020-01-16 NOTE — PROGRESS NOTES
[facility]  Inpatient Progress Note    Reason for Follow Up:  Enteritis    Subjective     Interval History:   She feels better overall.  Still with some epigastric left upper quadrant pain but she reports that this is improved.  She is now having some dysuria.  She is had no further diarrhea nausea or vomiting.    Current Facility-Administered Medications:   •  atorvastatin (LIPITOR) tablet 10 mg, 10 mg, Oral, Nightly, Mustapha Khan MD, 10 mg at 01/15/20 2022  •  famotidine (PEPCID) tablet 20 mg, 20 mg, Oral, BID AC, Mustapha Khan MD, 20 mg at 01/16/20 0638  •  levothyroxine (SYNTHROID, LEVOTHROID) tablet 75 mcg, 75 mcg, Oral, Q AM, Mustapha Khan MD, 75 mcg at 01/16/20 0638  •  morphine injection 1 mg, 1 mg, Intravenous, Q4H PRN, Mustapha Khan MD, 1 mg at 01/15/20 2215  •  ondansetron (ZOFRAN) injection 4 mg, 4 mg, Intravenous, Q4H PRN, Mustapha Khan MD  •  rOPINIRole (REQUIP) tablet 2 mg, 2 mg, Oral, Nightly, Mustapha Khan MD, 2 mg at 01/15/20 2022  •  simethicone (MYLICON) chewable tablet 80 mg, 80 mg, Oral, 4x Daily PRN, Danny Brown MD, 80 mg at 01/15/20 1423  •  sodium chloride 0.45 % with KCl 20 mEq/L infusion, 75 mL/hr, Intravenous, Continuous, Mustapha Khan MD, Last Rate: 75 mL/hr at 01/16/20 0936, 75 mL/hr at 01/16/20 0936  •  tamoxifen (NOLVADEX) tablet 20 mg, 20 mg, Oral, Nightly, Mustapha Khan MD, 20 mg at 01/15/20 2022  •  venlafaxine XR (EFFEXOR-XR) 24 hr capsule 225 mg, 225 mg, Oral, Nightly, Mustapha Khan MD, 225 mg at 01/15/20 1422  Review of Systems:    All systems were reviewed and negative except for:  Gastrointestinal: positive for  pain  Genitourinary: postivie for  painful urination    Objective     Vital Signs  Temp:  [97.3 °F (36.3 °C)-98.7 °F (37.1 °C)] 97.3 °F (36.3 °C)  Heart Rate:  [] 91  Resp:  [16-18] 18  BP: (103-117)/(63-70) 103/63  Body mass index is 28.73 kg/m².    Intake/Output Summary (Last 24 hours) at 1/16/2020 1013  Last data filed at  1/16/2020 0936  Gross per 24 hour   Intake 2130.58 ml   Output 1250 ml   Net 880.58 ml     I/O this shift:  In: 871 [P.O.:210; I.V.:661]  Out: 1000 [Urine:1000]     Physical Exam:   General: patient awake, alert and cooperative   Eyes: Normal lids and lashes, no scleral icterus   Neck: supple, normal ROM   Skin: warm and dry, not jaundiced   Abdomen: soft, mild tenderness in the epigastrium to the left of the midline in the left upper quadrant without rebound or guarding, nondistended; normal bowel sounds   Extremities: no rash or edema   Psychiatric: Normal mood and behavior; memory intact     Results Review:     I reviewed the patient's new clinical results.    Results from last 7 days   Lab Units 01/16/20  0458 01/15/20  0428 01/14/20  2130   WBC 10*3/mm3 4.49 8.29 14.62*   HEMOGLOBIN g/dL 9.8* 11.7* 14.1   HEMATOCRIT % 29.7* 36.4 43.4   PLATELETS 10*3/mm3 101* 124* 192     Results from last 7 days   Lab Units 01/16/20  0458 01/15/20  0428 01/14/20  2130   SODIUM mmol/L 143 143 142   POTASSIUM mmol/L 3.8 3.6 3.5   CHLORIDE mmol/L 109* 107 101   CO2 mmol/L 24.4 22.3 25.5   BUN mg/dL 13 17 19   CREATININE mg/dL 0.88 0.88 1.25*   CALCIUM mg/dL 8.1* 8.8 10.3   BILIRUBIN mg/dL 1.4*  --  1.1   ALK PHOS U/L 44  --  64   ALT (SGPT) U/L 13  --  17   AST (SGOT) U/L 13  --  19   GLUCOSE mg/dL 112* 131* 162*         Lab Results   Lab Value Date/Time    LIPASE 23 01/14/2020 2130    LIPASE 27 11/15/2016 1408       Radiology:  CT Abdomen Pelvis With Contrast   Final Result   IMPRESSION :    1. Homogeneous splenomegaly.   2. Mild small bowel dilatation with nonspecific air-fluid levels. For   reasons noted above, ileus or enteritis suspected over obstruction.   Suggest follow-up.   3. Anterior mediastinal lesion as discussed           This report was finalized on 1/15/2020 1:00 AM by Derian Swain M.D.              Assessment/Plan     Patient Active Problem List   Diagnosis   • Chronic idiopathic constipation   • Colon polyps    • Gastroesophageal reflux disease without esophagitis   • Gastroparesis   • Enteritis       Assessment:  1. Gastroenteritis  2. Dysuria  3. Upper abdominal pain-likely secondary to #1  4. Hepatic steatosis with splenomegaly      Plan:  · GI PCR normal  · I suspect her admission was secondary to an infectious gastroenteritis-she is improving with conservative management  · We will check a UA given complaints of dysuria  · We will add Carafate due to lingering upper abdominal discomfort  · She did recently had an outlying CT that showed hepatic steatosis with some subtle suggestion of portal hypertension.  We discussed this at length.  She is recently lost 20 pounds, she is treating her hyperlipidemia.  No elevation in transaminases.  She is developed a mild thrombocytopenia but it may be dilutional.  We discussed continued diet and lifestyle modification.  We will repeat an ultrasound of her liver in 6 months.  · If she has a good morning and early afternoon I would not be opposed to discharge later today.  She has scheduled follow-up with me in the office on February 13    I discussed the patients findings and my recommendations with patient.    Karolina Fenton MD

## 2020-01-17 VITALS
SYSTOLIC BLOOD PRESSURE: 128 MMHG | DIASTOLIC BLOOD PRESSURE: 76 MMHG | HEART RATE: 93 BPM | OXYGEN SATURATION: 95 % | WEIGHT: 200.2 LBS | HEIGHT: 70 IN | TEMPERATURE: 97.5 F | RESPIRATION RATE: 18 BRPM | BODY MASS INDEX: 28.66 KG/M2

## 2020-01-17 LAB
ANION GAP SERPL CALCULATED.3IONS-SCNC: 11.9 MMOL/L (ref 5–15)
BASOPHILS # BLD AUTO: 0.01 10*3/MM3 (ref 0–0.2)
BASOPHILS NFR BLD AUTO: 0.2 % (ref 0–1.5)
BUN BLD-MCNC: 12 MG/DL (ref 6–20)
BUN/CREAT SERPL: 15 (ref 7–25)
CALCIUM SPEC-SCNC: 8.6 MG/DL (ref 8.6–10.5)
CHLORIDE SERPL-SCNC: 104 MMOL/L (ref 98–107)
CO2 SERPL-SCNC: 25.1 MMOL/L (ref 22–29)
CREAT BLD-MCNC: 0.8 MG/DL (ref 0.57–1)
DEPRECATED RDW RBC AUTO: 42 FL (ref 37–54)
EOSINOPHIL # BLD AUTO: 0.13 10*3/MM3 (ref 0–0.4)
EOSINOPHIL NFR BLD AUTO: 2 % (ref 0.3–6.2)
ERYTHROCYTE [DISTWIDTH] IN BLOOD BY AUTOMATED COUNT: 14.6 % (ref 12.3–15.4)
GFR SERPL CREATININE-BSD FRML MDRD: 74 ML/MIN/1.73
GLUCOSE BLD-MCNC: 129 MG/DL (ref 65–99)
HCT VFR BLD AUTO: 31.5 % (ref 34–46.6)
HGB BLD-MCNC: 10.4 G/DL (ref 12–15.9)
LYMPHOCYTES # BLD AUTO: 1.11 10*3/MM3 (ref 0.7–3.1)
LYMPHOCYTES NFR BLD AUTO: 17.2 % (ref 19.6–45.3)
MCH RBC QN AUTO: 26.7 PG (ref 26.6–33)
MCHC RBC AUTO-ENTMCNC: 33 G/DL (ref 31.5–35.7)
MCV RBC AUTO: 81 FL (ref 79–97)
MONOCYTES # BLD AUTO: 0.52 10*3/MM3 (ref 0.1–0.9)
MONOCYTES NFR BLD AUTO: 8 % (ref 5–12)
NEUTROPHILS # BLD AUTO: 4.67 10*3/MM3 (ref 1.7–7)
NEUTROPHILS NFR BLD AUTO: 72.3 % (ref 42.7–76)
PLATELET # BLD AUTO: 111 10*3/MM3 (ref 140–450)
PMV BLD AUTO: 10.8 FL (ref 6–12)
POTASSIUM BLD-SCNC: 3.7 MMOL/L (ref 3.5–5.2)
RBC # BLD AUTO: 3.89 10*6/MM3 (ref 3.77–5.28)
SODIUM BLD-SCNC: 141 MMOL/L (ref 136–145)
WBC NRBC COR # BLD: 6.46 10*3/MM3 (ref 3.4–10.8)

## 2020-01-17 PROCEDURE — 99232 SBSQ HOSP IP/OBS MODERATE 35: CPT | Performed by: INTERNAL MEDICINE

## 2020-01-17 PROCEDURE — 80048 BASIC METABOLIC PNL TOTAL CA: CPT | Performed by: HOSPITALIST

## 2020-01-17 PROCEDURE — 85025 COMPLETE CBC W/AUTO DIFF WBC: CPT | Performed by: HOSPITALIST

## 2020-01-17 RX ORDER — LEVOFLOXACIN 750 MG/1
750 TABLET ORAL DAILY
Status: DISCONTINUED | OUTPATIENT
Start: 2020-01-17 | End: 2020-01-17 | Stop reason: ALTCHOICE

## 2020-01-17 RX ORDER — CEPHALEXIN 500 MG/1
500 CAPSULE ORAL EVERY 8 HOURS SCHEDULED
Qty: 21 CAPSULE | Refills: 0 | Status: SHIPPED | OUTPATIENT
Start: 2020-01-17 | End: 2020-01-24

## 2020-01-17 RX ORDER — CEPHALEXIN 500 MG/1
500 CAPSULE ORAL EVERY 8 HOURS SCHEDULED
Status: DISCONTINUED | OUTPATIENT
Start: 2020-01-17 | End: 2020-01-17 | Stop reason: HOSPADM

## 2020-01-17 RX ORDER — BISMUTH SUBSALICYLATE 262 MG/1
524 TABLET, CHEWABLE ORAL EVERY 6 HOURS PRN
Status: DISCONTINUED | OUTPATIENT
Start: 2020-01-17 | End: 2020-01-17

## 2020-01-17 RX ORDER — LEVOFLOXACIN 750 MG/1
750 TABLET ORAL DAILY
Qty: 7 TABLET | Refills: 0 | Status: SHIPPED | OUTPATIENT
Start: 2020-01-17 | End: 2020-01-17 | Stop reason: HOSPADM

## 2020-01-17 RX ADMIN — SUCRALFATE 1 G: 1 TABLET ORAL at 11:22

## 2020-01-17 RX ADMIN — CEPHALEXIN 500 MG: 500 CAPSULE ORAL at 14:20

## 2020-01-17 RX ADMIN — SIMETHICONE CHEW TAB 80 MG 80 MG: 80 TABLET ORAL at 06:27

## 2020-01-17 RX ADMIN — FAMOTIDINE 20 MG: 20 TABLET, FILM COATED ORAL at 06:25

## 2020-01-17 RX ADMIN — SUCRALFATE 1 G: 1 TABLET ORAL at 06:25

## 2020-01-17 RX ADMIN — LEVOTHYROXINE SODIUM 75 MCG: 75 TABLET ORAL at 06:25

## 2020-01-17 RX ADMIN — Medication 524 MG: at 11:51

## 2020-01-17 NOTE — DISCHARGE SUMMARY
Discharge summary    Date of admission 1/14/2020  Date of discharge 1/17/2020    Final diagnosis  Acute E. coli UTI  Acute gastroenteritis  Dehydration  Hypothyroidism   Hyperlipidemia  Restless leg syndrome  Gastroesophageal reflux disease    Discharge medications    Current Facility-Administered Medications:   •  atorvastatin (LIPITOR) tablet 10 mg, 10 mg, Oral, Nightly, Mustapha Khan MD, 10 mg at 01/16/20 2043  •  famotidine (PEPCID) tablet 20 mg, 20 mg, Oral, BID AC, Mustapha Khan MD, 20 mg at 01/17/20 0625  •  levoFLOXacin (LEVAQUIN) tablet 750 mg, 750 mg, Oral, Daily, Mustapha Khan MD  •  levothyroxine (SYNTHROID, LEVOTHROID) tablet 75 mcg, 75 mcg, Oral, Q AM, Mustapha Khan MD, 75 mcg at 01/17/20 0625  •  rOPINIRole (REQUIP) tablet 2 mg, 2 mg, Oral, Nightly, Mustapha Khan MD, 2 mg at 01/16/20 2043  •  sucralfate (CARAFATE) tablet 1 g, 1 g, Oral, 4x Daily AC & at Bedtime, Karolina Fenton MD, 1 g at 01/17/20 1122  •  tamoxifen (NOLVADEX) tablet 20 mg, 20 mg, Oral, Nightly, Mustapha Khan MD, 20 mg at 01/16/20 2043  •  venlafaxine XR (EFFEXOR-XR) 24 hr capsule 225 mg, 225 mg, Oral, Nightly, Mustapha Khan MD, 225 mg at 01/16/20 2043     Consults obtained  Gastroenterology    Procedures   None    Hospital course  59-year white female with history of restless leg syndrome hypertension hyperlipidemia hypothyroidism and gastroesophageal reflux disease admit to emergency room abdominal pain nausea vomiting diarrhea.  Patient evaluated found to have acute gastroenteritis admit for management.  Patient admitted she was dehydrated received IV fluid and symptomatic treatment her stool cultures are remaining negative but she does have E. coli in the urine 100,000 colonies started on Levaquin.  Patient symptoms improved no more abdominal pain vomiting but she still have some loose stools and nausea and wants to go home.  Patient will continue oral antibiotics at home.  Patient was followed by gastroenterology and they  cleared for discharge.  She is well-hydrated at the time of discharge.    Discharge diet regular    Activity as tolerated    Medication as above    Follow-up with primary doctor in 1 week and take medication as directed follow-up with gastroenterology per the instructions    MARTHA SUAREZ MD

## 2020-01-17 NOTE — PROGRESS NOTES
"Daily progress note    Chief complaint  Doing better    No new complaints but is still with diarrhea and nausea but no vomiting  No abdominal pain  Wants to go home  Family at bedside      History of present illness  59-year-old white female with history of breast cancer in the past restless leg syndrome hyperlipidemia hypothyroidism and gastroesophageal reflux disease presented to [facility] emergency room with abdominal pain for last 5 days followed by nausea vomiting and diarrhea.  Patient unable to keep anything down.  Patient denies any fever chills.  Patient denies any black stools blood in the stools or blood in the vomitus.  Patient evaluated in ER found to have acute enteritis with possible ileus admit for management.     REVIEW OF SYSTEMS  Remarkable for nausea and diarrhea    PHYSICAL EXAM  Blood pressure 128/76, pulse 93, temperature 97.5 °F (36.4 °C), temperature source Oral, resp. rate 18, height 177.8 cm (70\"), weight 90.8 kg (200 lb 3.2 oz), SpO2 95 %.    Constitutional: She is oriented to person, place, and time. No distress.   Head: Normocephalic and atraumatic.   Mouth/Throat: Mucous membranes are dry.   Eyes: Pupils are equal, round, and reactive to light.   Neck: Normal range of motion.   Cardiovascular: Regular rhythm. Tachycardia present.   No murmur heard.  Pulmonary/Chest: Effort normal and breath sounds normal. No respiratory distress.   Abdominal: Soft. She exhibits distension. There is tenderness (diffuse). There is guarding. There is no rebound. High-pitched bowel sounds   Musculoskeletal: Normal range of motion. She exhibits no edema.   no pedal edema or calf tenderness   Neurological: She is alert and oriented to person, place, and time. She has normal sensation and normal strength. No focal neurological deficits.   Skin: Skin is warm and dry.   Psych normal mood behavior     LAB RESULTS  Lab Results (last 24 hours)     Procedure Component Value Units Date/Time    Urine " Culture - Urine, Urine, Clean Catch [[id number]]  (Abnormal) Collected:  01/16/20 1432    Specimen:  Urine, Clean Catch Updated:  01/17/20 0642     Urine Culture >100,000 CFU/mL Escherichia coli    Basic Metabolic Panel [[id number]]  (Abnormal) Collected:  01/17/20 0442    Specimen:  Blood from Hand, Left Updated:  01/17/20 0539     Glucose 129 mg/dL      BUN 12 mg/dL      Creatinine 0.80 mg/dL      Sodium 141 mmol/L      Potassium 3.7 mmol/L      Chloride 104 mmol/L      CO2 25.1 mmol/L      Calcium 8.6 mg/dL      eGFR Non African Amer 74 mL/min/1.73      BUN/Creatinine Ratio 15.0     Anion Gap 11.9 mmol/L     Narrative:       GFR Normal >60  Chronic Kidney Disease <60  Kidney Failure <15      CBC & Differential [[id number]] Collected:  01/17/20 0442    Specimen:  Blood from Hand, Left Updated:  01/17/20 0519    Narrative:       The following orders were created for panel order CBC & Differential.  Procedure                               Abnormality         Status                     ---------                               -----------         ------                     CBC Auto Differential[[id number]]        Abnormal            Final result                 Please view results for these tests on the individual orders.    CBC Auto Differential [[id number]]  (Abnormal) Collected:  01/17/20 0442    Specimen:  Blood from Hand, Left Updated:  01/17/20 0519     WBC 6.46 10*3/mm3      RBC 3.89 10*6/mm3      Hemoglobin 10.4 g/dL      Hematocrit 31.5 %      MCV 81.0 fL      MCH 26.7 pg      MCHC 33.0 g/dL      RDW 14.6 %      RDW-SD 42.0 fl      MPV 10.8 fL      Platelets 111 10*3/mm3      Neutrophil % 72.3 %      Lymphocyte % 17.2 %      Monocyte % 8.0 %      Eosinophil % 2.0 %      Basophil % 0.2 %      Neutrophils, Absolute 4.67 10*3/mm3      Lymphocytes, Absolute 1.11 10*3/mm3      Monocytes, Absolute 0.52 10*3/mm3      Eosinophils, Absolute 0.13 10*3/mm3      Basophils, Absolute 0.01 10*3/mm3     Blood Culture - Blood, Arm,  Left [[id number]] Collected:  01/14/20 2245    Specimen:  Blood from Arm, Left Updated:  01/16/20 2315     Blood Culture No growth at 2 days    Blood Culture - Blood, Wrist, Left [[id number]] Collected:  01/14/20 2157    Specimen:  Blood from Wrist, Left Updated:  01/16/20 2215     Blood Culture No growth at 2 days    Urinalysis, Microscopic Only - Urine, Clean Catch [[id number]]  (Abnormal) Collected:  01/16/20 1432    Specimen:  Urine, Clean Catch Updated:  01/16/20 1620     RBC, UA 3-5 /HPF      WBC, UA Too Numerous to Count /HPF      Bacteria, UA 4+ /HPF      Squamous Epithelial Cells, UA 3-6 /HPF      Hyaline Casts, UA 7-12 /LPF      Methodology Manual Light Microscopy    Urinalysis With Culture If Indicated - Urine, Clean Catch [[id number]]  (Abnormal) Collected:  01/16/20 1432    Specimen:  Urine, Clean Catch Updated:  01/16/20 1525     Color, UA Yellow     Appearance, UA Cloudy     pH, UA <=5.0     Specific Gravity, UA 1.018     Glucose, UA Negative     Ketones, UA Negative     Bilirubin, UA Negative     Blood, UA Small (1+)     Protein, UA Negative     Leuk Esterase, UA Moderate (2+)     Nitrite, UA Positive     Urobilinogen, UA 2.0 E.U./dL        Imaging Results (Last 24 Hours)     ** No results found for the last 24 hours. **          Current Facility-Administered Medications:   •  atorvastatin (LIPITOR) tablet 10 mg, 10 mg, Oral, Nightly, Mustapha Khan MD, 10 mg at 01/16/20 2043  •  bismuth subsalicylate (PEPTO BISMOL) chewable tablet 524 mg, 524 mg, Oral, Q6H PRN, Pita Solis MD, 524 mg at 01/17/20 1151  •  famotidine (PEPCID) tablet 20 mg, 20 mg, Oral, BID AC, Mustapha Khan MD, 20 mg at 01/17/20 0625  •  levothyroxine (SYNTHROID, LEVOTHROID) tablet 75 mcg, 75 mcg, Oral, Q AM, Mustapha Khan MD, 75 mcg at 01/17/20 0625  •  morphine injection 1 mg, 1 mg, Intravenous, Q4H PRN, Mustapha Khan MD, 1 mg at 01/15/20 6525  •  ondansetron (ZOFRAN) injection 4 mg, 4 mg, Intravenous, Q4H PRN, Mustapha Khan MD  •   rOPINIRole (REQUIP) tablet 2 mg, 2 mg, Oral, Nightly, Martha Khan MD, 2 mg at 01/16/20 2043  •  simethicone (MYLICON) chewable tablet 80 mg, 80 mg, Oral, 4x Daily PRN, Danny Brown MD, 80 mg at 01/17/20 0627  •  sucralfate (CARAFATE) tablet 1 g, 1 g, Oral, 4x Daily AC & at Bedtime, Karolina Fenton MD, 1 g at 01/17/20 1122  •  tamoxifen (NOLVADEX) tablet 20 mg, 20 mg, Oral, Nightly, Martha Khan MD, 20 mg at 01/16/20 2043  •  venlafaxine XR (EFFEXOR-XR) 24 hr capsule 225 mg, 225 mg, Oral, Nightly, Martha Khan MD, 225 mg at 01/16/20 2043     ASSESSMENT  Acute E. coli UTI  Acute gastroenteritis  Dehydration  Hypothyroidism   Hyperlipidemia  Restless leg syndrome  Gastroesophageal reflux disease    PLAN  Discharge home on oral antibiotics for for 7 days  Discharge summary dictated    MARTHA KHAN MD

## 2020-01-17 NOTE — PLAN OF CARE
Problem: Patient Care Overview  Goal: Plan of Care Review  Outcome: Ongoing (interventions implemented as appropriate)  Flowsheets (Taken 1/17/2020 0500)  Progress: improving  Plan of Care Reviewed With: patient  Note:   VSS. Pt has complaints of abd pain but refused pain meds. Pt states she has relief with Carafate. Voidingly freely. Will cont to monitor.

## 2020-01-17 NOTE — PROGRESS NOTES
Discharge Planning Assessment  [facility]     Patient Name: Radha Moody  MRN: [id number]  Today's Date: 1/17/2020    Admit Date: 1/14/2020      Discharge Plan     Row Name 01/17/20 1312       Plan    Plan Comments  Discharge order, no discharge needs identified.    Final Discharge Disposition Code  01 - home or self-care     Expected Discharge Date and Time     Expected Discharge Date Expected Discharge Time    Jan 17, 2020          Марина Carpenter RN

## 2020-01-17 NOTE — PROGRESS NOTES
[facility], [place], operated by [facility]  Inpatient Progress Note    Reason for Follow Up:  Enteritis    Subjective     Interval History:   Did well yesterday.  Having return of pain this morning, upper abdomen but now tolerating yogurt.  Loose stool this morning.      Current Facility-Administered Medications:   •  atorvastatin (LIPITOR) tablet 10 mg, 10 mg, Oral, Nightly, Mustapha Khan MD, 10 mg at 01/16/20 2043  •  famotidine (PEPCID) tablet 20 mg, 20 mg, Oral, BID AC, Mustapha Khan MD, 20 mg at 01/17/20 0625  •  levothyroxine (SYNTHROID, LEVOTHROID) tablet 75 mcg, 75 mcg, Oral, Q AM, Mustapha Khna MD, 75 mcg at 01/17/20 0625  •  morphine injection 1 mg, 1 mg, Intravenous, Q4H PRN, Mustapha Khan MD, 1 mg at 01/15/20 2215  •  ondansetron (ZOFRAN) injection 4 mg, 4 mg, Intravenous, Q4H PRN, Mustapha Khan MD  •  rOPINIRole (REQUIP) tablet 2 mg, 2 mg, Oral, Nightly, Mustapha Khan MD, 2 mg at 01/16/20 2043  •  simethicone (MYLICON) chewable tablet 80 mg, 80 mg, Oral, 4x Daily PRN, Danny Brown MD, 80 mg at 01/17/20 0627  •  sodium chloride 0.45 % with KCl 20 mEq/L infusion, 75 mL/hr, Intravenous, Continuous, Mustapha Khan MD, Last Rate: 75 mL/hr at 01/16/20 0936, 75 mL/hr at 01/16/20 0936  •  sucralfate (CARAFATE) tablet 1 g, 1 g, Oral, 4x Daily AC & at Bedtime, Karolina Fenton MD, 1 g at 01/17/20 0625  •  tamoxifen (NOLVADEX) tablet 20 mg, 20 mg, Oral, Nightly, Mustapha Khan MD, 20 mg at 01/16/20 2043  •  venlafaxine XR (EFFEXOR-XR) 24 hr capsule 225 mg, 225 mg, Oral, Nightly, Mustapha Khan MD, 225 mg at 01/16/20 2043  Review of Systems:   All systems reviewed and negative except for: GI: pain, diarrhea    Objective     Vital Signs  Temp:  [97.5 °F (36.4 °C)-99 °F (37.2 °C)] 97.5 °F (36.4 °C)  Heart Rate:  [87-93] 93  Resp:  [18] 18  BP: ()/(66-76) 128/76  Body mass index is 28.73 kg/m².    Intake/Output Summary (Last 24 hours) at 1/17/2020 1027  Last data filed at 1/17/2020 0523  Gross per 24 hour    Intake 420 ml   Output 2400 ml   Net -1980 ml     No intake/output data recorded.     Physical Exam:   General: patient awake, alert and cooperative   Eyes: Normal lids and lashes, no scleral icterus   Neck: supple, normal ROM   Skin: warm and dry, not jaundiced   Cardiovascular: regular rhythm and rate, no murmurs auscultated   Pulm: clear to auscultation bilaterally, regular and unlabored   Abdomen: soft, obese, tender, nondistended; normal bowel sounds   Rectal: deferred   Extremities: no rash or edema   Psychiatric: Normal mood and behavior; memory intact     Results Review:     I reviewed the patient's new clinical results.    Results from last 7 days   Lab Units 01/17/20  0442 01/16/20  0458 01/15/20  0428   WBC 10*3/mm3 6.46 4.49 8.29   HEMOGLOBIN g/dL 10.4* 9.8* 11.7*   HEMATOCRIT % 31.5* 29.7* 36.4   PLATELETS 10*3/mm3 111* 101* 124*     Results from last 7 days   Lab Units 01/17/20  0442 01/16/20  0458 01/15/20  0428 01/14/20  2130   SODIUM mmol/L 141 143 143 142   POTASSIUM mmol/L 3.7 3.8 3.6 3.5   CHLORIDE mmol/L 104 109* 107 101   CO2 mmol/L 25.1 24.4 22.3 25.5   BUN mg/dL 12 13 17 19   CREATININE mg/dL 0.80 0.88 0.88 1.25*   CALCIUM mg/dL 8.6 8.1* 8.8 10.3   BILIRUBIN mg/dL  --  1.4*  --  1.1   ALK PHOS U/L  --  44  --  64   ALT (SGPT) U/L  --  13  --  17   AST (SGOT) U/L  --  13  --  19   GLUCOSE mg/dL 129* 112* 131* 162*         Lab Results   Lab Value Date/Time    LIPASE 23 01/14/2020 2130    LIPASE 27 11/15/2016 1408       Radiology:  CT Abdomen Pelvis With Contrast   Final Result   IMPRESSION :    1. Homogeneous splenomegaly.   2. Mild small bowel dilatation with nonspecific air-fluid levels. For   reasons noted above, ileus or enteritis suspected over obstruction.   Suggest follow-up.   3. Anterior mediastinal lesion as discussed           This report was finalized on 1/15/2020 1:00 AM by Derian Swain M.D.              Assessment/Plan     Patient Active Problem List   Diagnosis   • Chronic  idiopathic constipation   • Colon polyps   • Gastroesophageal reflux disease without esophagitis   • Gastroparesis   • Enteritis       Impression  1. Gastroenteritis: some return of symptoms today but mild    2. Upper abdominal pain    3. Hepatic steatosis with splenomegaly    Plan  Stop IVF  Dose of zofran before lunch  Dose of pepto now and prn for diarrhea, abdominal pain  We will see how she does after lunch.  If she is feeling better and feels like she can tolerate symptoms at home, I think it is okay for her to go home.  She does have follow-up with Dr. Reynolds scheduled for February.    I discussed the patients findings and my recommendations with patient, family and nursing staff.    Pita Solis MD

## 2020-01-18 LAB — BACTERIA SPEC AEROBE CULT: ABNORMAL

## 2020-01-19 LAB
BACTERIA SPEC AEROBE CULT: NORMAL
BACTERIA SPEC AEROBE CULT: NORMAL

## 2020-02-13 ENCOUNTER — OFFICE VISIT (OUTPATIENT)
Dept: GASTROENTEROLOGY | Facility: CLINIC | Age: 60
End: 2020-02-13

## 2020-02-13 VITALS
HEIGHT: 70 IN | WEIGHT: 200.9 LBS | DIASTOLIC BLOOD PRESSURE: 80 MMHG | TEMPERATURE: 97.8 F | BODY MASS INDEX: 28.76 KG/M2 | SYSTOLIC BLOOD PRESSURE: 126 MMHG

## 2020-02-13 DIAGNOSIS — K59.04 CHRONIC IDIOPATHIC CONSTIPATION: ICD-10-CM

## 2020-02-13 DIAGNOSIS — K76.0 HEPATIC STEATOSIS: Primary | ICD-10-CM

## 2020-02-13 DIAGNOSIS — K21.9 GASTROESOPHAGEAL REFLUX DISEASE WITHOUT ESOPHAGITIS: ICD-10-CM

## 2020-02-13 DIAGNOSIS — R16.1 SPLENOMEGALY: ICD-10-CM

## 2020-02-13 PROCEDURE — 99213 OFFICE O/P EST LOW 20 MIN: CPT | Performed by: INTERNAL MEDICINE

## 2020-02-13 NOTE — PROGRESS NOTES
Subjective   Chief Complaint   Patient presents with   • Gastroparesis       Radha Moody is a  59 y.o. female here for a follow up visit for gastroparesis, fatty liver, recent admission for enteritis.     She has been doing better since she is been discharged from the hospital.  She did notice that she had an increase in her normal pain about 2 weeks post discharge but that has since resolved.  Her bowel movements are still somewhat erratic.  She had not yet gone back on her Linzess.  She took a dose this morning and feels like she is getting back on track.  She has no heartburn.  She takes Dexilant approximately every other day and that seems to take care of it.  She is had no nausea or vomiting.  She continues to try to watch her diet and focus on weight loss.  She is lost 17 pounds since last summer.  She is on tamoxifen until 2/21.      HPI  Past Medical History:   Diagnosis Date   • Anemia 6/2010   • Cancer (CMS/HCC)    • Colon polyp    • Diabetes mellitus (CMS/HCC) 7/2018    PRE   • Elevated cholesterol    • Fatty liver 12/19   • Gastroparesis    • GERD (gastroesophageal reflux disease)    • HX: breast cancer 2010   • Hyperlipidemia    • Internal hemorrhoid    • RLS (restless legs syndrome)    • Sleep apnea    • Thyroid disease      Past Surgical History:   Procedure Laterality Date   • BREAST LUMPECTOMY     • BREAST RECONSTRUCTION     • CHOLECYSTECTOMY     • COLONOSCOPY  2011    Per pt. IH, diverticulitis, otherwise normal (dr. Matias)   • COLONOSCOPY N/A 6/22/2016    polyps, tics, NBIH, hyperplastic polyp x 2   • UPPER GASTROINTESTINAL ENDOSCOPY  03/17/2017    multiple fundic gland polyps       Current Outpatient Medications:   •  atorvastatin (LIPITOR) 20 MG tablet, Take 20 mg by mouth Every Night., Disp: , Rfl:   •  dexlansoprazole (DEXILANT) 60 MG capsule, Take 1 capsule by mouth Daily. (Patient taking differently: Take 60 mg by mouth Every Night.), Disp: 90 capsule, Rfl: 3  •  levothyroxine  (SYNTHROID, LEVOTHROID) 75 MCG tablet, Take 88 mcg by mouth Every Night., Disp: , Rfl:   •  omega-3 acid ethyl esters (LOVAZA) 1 g capsule, Take 4 g by mouth Every Night., Disp: , Rfl:   •  rOPINIRole (REQUIP) 2 MG tablet, Take 2 mg by mouth Every Night., Disp: , Rfl:   •  Semaglutide (OZEMPIC, 0.25 OR 0.5 MG/DOSE, SC), Inject 0.5 mg under the skin into the appropriate area as directed 1 (One) Time Per Week. takes on sunday, Disp: , Rfl:   •  tamoxifen (NOLVADEX) 20 MG chemo tablet, Take 20 mg by mouth Every Night., Disp: , Rfl:   •  venlafaxine XR (EFFEXOR-XR) 150 MG 24 hr capsule, Take 225 mg by mouth Every Night., Disp: , Rfl:   •  glucose blood (EXACTECH TEST) test strip, 1 strip by Other route Daily., Disp: , Rfl:   PRN Meds:.  No Known Allergies  Social History     Socioeconomic History   • Marital status:      Spouse name: Not on file   • Number of children: Not on file   • Years of education: Not on file   • Highest education level: Not on file   Tobacco Use   • Smoking status: Never Smoker   • Smokeless tobacco: Never Used   Substance and Sexual Activity   • Alcohol use: Yes     Comment: occasssionally   • Drug use: No   • Sexual activity: Not Currently     Partners: Male     Birth control/protection: Post-menopausal     Family History   Problem Relation Age of Onset   • Colon polyps Sister    • Arthritis Sister    • Depression Sister    • Hyperlipidemia Sister    • Colon polyps Sister    • Depression Sister    • Hyperlipidemia Sister    • Colon polyps Sister    • Depression Sister    • Hyperlipidemia Sister    • Colon polyps Sister    • Depression Sister    • Hyperlipidemia Sister    • Depression Mother    • Alcohol abuse Father    • Arthritis Brother    • Asthma Son    • Colon polyps Sister    • Colon polyps Sister    • Colon polyps Sister    • Colon polyps Sister      Review of Systems   Constitutional: Negative for appetite change and unexpected weight change.   Gastrointestinal: Negative for  abdominal pain and blood in stool.   All other systems reviewed and are negative.    Vitals:    02/13/20 0854   BP: 126/80   Temp: 97.8 °F (36.6 °C)         02/13/20  0854   Weight: 91.1 kg (200 lb 14.4 oz)       Objective   Physical Exam   Constitutional: She appears well-developed and well-nourished.   HENT:   Head: Normocephalic and atraumatic.   Eyes: No scleral icterus.   Pulmonary/Chest: Effort normal.   Abdominal: Soft. She exhibits no distension.   Neurological: She is alert.   Skin: Skin is warm and dry.   Psychiatric: She has a normal mood and affect.     No radiology results for the last 7 days    Assessment/Plan   Diagnoses and all orders for this visit:    Hepatic steatosis    Splenomegaly    Chronic idiopathic constipation    Gastroesophageal reflux disease without esophagitis      Plan:  · Continue efforts at weight loss-discussed diet and exercise  · Will plan for repeat imaging in July to follow-up on hepatic steatosis and findings on CT done at [facility] which showed subtle changes of possible portal hypertension.  We will repeat labs at that time.  · Continue Dexilant for heartburn.  · She is not having any significant gastroparesis symptoms at this time.  · Linzess as needed for chronic idiopathic constipation.0

## 2020-02-14 ENCOUNTER — TELEPHONE (OUTPATIENT)
Dept: GASTROENTEROLOGY | Facility: CLINIC | Age: 60
End: 2020-02-14

## 2020-02-14 NOTE — TELEPHONE ENCOUNTER
"----- Message from Radha Moody sent at 2/14/2020  2:08 PM EST -----  Regarding: Non-Urgent Medical Question  Contact: [phone]  Daisy Fenton,   I may have misunderstood you Thursday when you said you would schedule a CT for the \"Portal Vein Hypertension\" from my CT scan from [facility] in December.  Is this something that can wait until July or were you going to schedule something sooner. I do have an appointment for July 1 but thought I would double check. Thanks  "

## 2020-04-20 ENCOUNTER — TELEPHONE (OUTPATIENT)
Dept: GASTROENTEROLOGY | Facility: CLINIC | Age: 60
End: 2020-04-20

## 2020-04-20 NOTE — TELEPHONE ENCOUNTER
----- Message from Radha Moody sent at 4/20/2020  9:04 AM EDT -----  Regarding: Non-Urgent Medical Question  Contact: [phone]  Good Morning Dr. Fenton!  I hope your family is well and hanging in there and we can get back to normal when it is safe.     I don't know if this is anything to be concerned about, but I thought you needed to know.  I have had 3 separate episodes of gastric problems since leaving the hospital in January. They are just like the one that put me in the hospital. Same symptoms, stomach pain with bloating, abdominal gurgling, diarrhea,  belching of sulfur, fatigue and nausea. The first one was in the middle of March and the last two were over Easter weekend and this past week end. I'm trying to figure out if it is something I'm doing. However, I thought you might want to know. Let me know what you think. Thanks

## 2020-04-22 NOTE — TELEPHONE ENCOUNTER
Symptoms may very well be related to her known delayed gastric emptying.  This can happen episodically.  When she becomes symptomatic that is a sign that she needs to back off solid foods and stick with a more liquid diet until her symptoms subside.  Avoid large meals, foods that are high in fat and eating too much fiber consecutively.  If symptoms become more problematic or frequent, I would like to see her prior to August.

## 2020-07-01 ENCOUNTER — HOSPITAL ENCOUNTER (OUTPATIENT)
Dept: CT IMAGING | Facility: HOSPITAL | Age: 60
Discharge: HOME | End: 2020-07-01
Admitting: INTERNAL MEDICINE

## 2020-07-01 DIAGNOSIS — R16.1 SPLENOMEGALY: ICD-10-CM

## 2020-07-01 DIAGNOSIS — K76.0 HEPATIC STEATOSIS: ICD-10-CM

## 2020-07-01 LAB — CREAT BLDA-MCNC: 1.1 MG/DL (ref 0.6–1.3)

## 2020-07-01 PROCEDURE — 82565 ASSAY OF CREATININE: CPT

## 2020-07-01 PROCEDURE — 74177 CT ABD & PELVIS W/CONTRAST: CPT

## 2020-07-01 PROCEDURE — 25010000002 IOPAMIDOL 61 % SOLUTION: Performed by: INTERNAL MEDICINE

## 2020-07-01 PROCEDURE — 0 DIATRIZOATE MEGLUMINE & SODIUM PER 1 ML: Performed by: INTERNAL MEDICINE

## 2020-07-01 RX ORDER — OMEGA-3/DHA/EPA/FISH OIL 60 MG-90MG
CAPSULE ORAL
COMMUNITY
Start: 2018-08-14 | End: 2022-10-26

## 2020-07-01 RX ADMIN — IOPAMIDOL 100 ML: 612 INJECTION, SOLUTION INTRAVENOUS at 09:12

## 2020-07-01 RX ADMIN — DIATRIZOATE MEGLUMINE AND DIATRIZOATE SODIUM 30 ML: 600; 100 SOLUTION ORAL; RECTAL at 08:16

## 2020-07-05 ENCOUNTER — TELEPHONE (OUTPATIENT)
Dept: GASTROENTEROLOGY | Facility: CLINIC | Age: 60
End: 2020-07-05

## 2020-07-05 DIAGNOSIS — R16.1 SPLENOMEGALY: Primary | ICD-10-CM

## 2020-07-05 NOTE — TELEPHONE ENCOUNTER
Please let her know that her liver looks unremarkable on this CT scan.  There continues to be very minimal enlargement of her spleen.  Her platelets were also low in January of this year.  Please check to see if she is had any recent blood work (CBC) if she has please requested.  If she has not then I recommend she have a repeat CBC, cmp.

## 2020-07-07 NOTE — TELEPHONE ENCOUNTER
Test Results Question     From  Radha Moody To  [facility] Clinical Pool Sent  7/7/2020 12:12 PM   Just checking on results from CT scan from July 1st. Thanks     **Call to pt.  Advise per Dr Fenton note.  Verb understanding.  No recent labs.  Lab appt scheduled for 7/8 @ 10 am.  Order placed for cbc, cmp - message to Dr Fenton.

## 2020-07-08 ENCOUNTER — LAB (OUTPATIENT)
Dept: GASTROENTEROLOGY | Facility: CLINIC | Age: 60
End: 2020-07-08

## 2020-07-09 DIAGNOSIS — K21.9 GASTROESOPHAGEAL REFLUX DISEASE WITHOUT ESOPHAGITIS: ICD-10-CM

## 2020-07-09 LAB
ALBUMIN SERPL-MCNC: 4.2 G/DL (ref 3.5–5.2)
ALBUMIN/GLOB SERPL: 1.7 G/DL
ALP SERPL-CCNC: 59 U/L (ref 39–117)
ALT SERPL-CCNC: 15 U/L (ref 1–33)
AST SERPL-CCNC: 16 U/L (ref 1–32)
BASOPHILS # BLD AUTO: ABNORMAL 10*3/UL
BILIRUB SERPL-MCNC: 0.5 MG/DL (ref 0–1.2)
BUN SERPL-MCNC: 17 MG/DL (ref 6–20)
BUN/CREAT SERPL: 16.5 (ref 7–25)
CALCIUM SERPL-MCNC: 9.4 MG/DL (ref 8.6–10.5)
CHLORIDE SERPL-SCNC: 105 MMOL/L (ref 98–107)
CO2 SERPL-SCNC: 29.8 MMOL/L (ref 22–29)
CREAT SERPL-MCNC: 1.03 MG/DL (ref 0.57–1)
DIFFERENTIAL COMMENT: ABNORMAL
EOSINOPHIL # BLD AUTO: ABNORMAL 10*3/UL
EOSINOPHIL # BLD MANUAL: 0.14 10*3/MM3 (ref 0–0.4)
EOSINOPHIL NFR BLD AUTO: ABNORMAL %
EOSINOPHIL NFR BLD MANUAL: 2 % (ref 0.3–6.2)
ERYTHROCYTE [DISTWIDTH] IN BLOOD BY AUTOMATED COUNT: 14.5 % (ref 12.3–15.4)
GLOBULIN SER CALC-MCNC: 2.5 GM/DL
GLUCOSE SERPL-MCNC: 153 MG/DL (ref 65–99)
HCT VFR BLD AUTO: 34.1 % (ref 34–46.6)
HGB BLD-MCNC: 11.1 G/DL (ref 12–15.9)
LYMPHOCYTES # BLD AUTO: ABNORMAL 10*3/UL
LYMPHOCYTES # BLD MANUAL: 0.86 10*3/MM3 (ref 0.7–3.1)
LYMPHOCYTES NFR BLD AUTO: ABNORMAL %
LYMPHOCYTES NFR BLD MANUAL: 12.1 % (ref 19.6–45.3)
MCH RBC QN AUTO: 27.6 PG (ref 26.6–33)
MCHC RBC AUTO-ENTMCNC: 32.6 G/DL (ref 31.5–35.7)
MCV RBC AUTO: 84.8 FL (ref 79–97)
MONOCYTES # BLD MANUAL: 0.07 10*3/MM3 (ref 0.1–0.9)
MONOCYTES NFR BLD AUTO: ABNORMAL %
MONOCYTES NFR BLD MANUAL: 1 % (ref 5–12)
NEUTROPHILS # BLD MANUAL: 6 10*3/MM3 (ref 1.7–7)
NEUTROPHILS NFR BLD AUTO: ABNORMAL %
NEUTROPHILS NFR BLD MANUAL: 84.8 % (ref 42.7–76)
PLATELET # BLD AUTO: 126 10*3/MM3 (ref 140–450)
PLATELET BLD QL SMEAR: ABNORMAL
POTASSIUM SERPL-SCNC: 4.1 MMOL/L (ref 3.5–5.2)
PROT SERPL-MCNC: 6.7 G/DL (ref 6–8.5)
RBC # BLD AUTO: 4.02 10*6/MM3 (ref 3.77–5.28)
RBC MORPH BLD: ABNORMAL
SODIUM SERPL-SCNC: 142 MMOL/L (ref 136–145)
WBC # BLD AUTO: 7.08 10*3/MM3 (ref 3.4–10.8)

## 2020-07-09 RX ORDER — DEXLANSOPRAZOLE 60 MG/1
60 CAPSULE, DELAYED RELEASE ORAL NIGHTLY
Qty: 90 CAPSULE | Refills: 3 | Status: SHIPPED | OUTPATIENT
Start: 2020-07-09 | End: 2021-07-14

## 2020-07-09 NOTE — TELEPHONE ENCOUNTER
Message sent to Keisha HERNANDEZ regarding refilling of dexilant . Pt does have f/u scheduled with Dr Fenton in Aug.

## 2020-07-16 ENCOUNTER — TELEPHONE (OUTPATIENT)
Dept: GASTROENTEROLOGY | Facility: CLINIC | Age: 60
End: 2020-07-16

## 2020-07-16 NOTE — TELEPHONE ENCOUNTER
Her platelets are persistently low (mildly).  With her mildly enlarged spleen I think she should see a hematologist regarding these findings. Her liver appears to be normal-need to explore other explanations for these findings.  If she is agreeable I will put in a referral

## 2020-07-17 NOTE — TELEPHONE ENCOUNTER
Call to pt.  Advise per Dr Fenton note.  Verb understanding.     States follows with Hematologist, DR Jolie Handley, @ [facility].  Requests send these results.  Faxed via [employer].     Update to Dr Fenton.

## 2020-08-12 ENCOUNTER — OFFICE VISIT (OUTPATIENT)
Dept: GASTROENTEROLOGY | Facility: CLINIC | Age: 60
End: 2020-08-12

## 2020-08-12 VITALS — BODY MASS INDEX: 30.46 KG/M2 | WEIGHT: 212.8 LBS | TEMPERATURE: 97.1 F | HEIGHT: 70 IN

## 2020-08-12 DIAGNOSIS — K59.04 CHRONIC IDIOPATHIC CONSTIPATION: ICD-10-CM

## 2020-08-12 DIAGNOSIS — K21.9 GASTROESOPHAGEAL REFLUX DISEASE WITHOUT ESOPHAGITIS: Primary | ICD-10-CM

## 2020-08-12 DIAGNOSIS — R16.1 SPLENOMEGALY: ICD-10-CM

## 2020-08-12 DIAGNOSIS — K63.5 HYPERPLASTIC COLONIC POLYP, UNSPECIFIED PART OF COLON: ICD-10-CM

## 2020-08-12 DIAGNOSIS — K76.0 HEPATIC STEATOSIS: ICD-10-CM

## 2020-08-12 DIAGNOSIS — K31.84 GASTROPARESIS: ICD-10-CM

## 2020-08-12 PROCEDURE — 99214 OFFICE O/P EST MOD 30 MIN: CPT | Performed by: INTERNAL MEDICINE

## 2020-08-12 NOTE — PROGRESS NOTES
Subjective   Chief Complaint   Patient presents with   • Hepatic Disease   • Gastroparesis flare 7/2020       Radha Moody is a  60 y.o. female here for a follow up visit for gerd, gastroparesis.     GERD well controlled on dexilant everyother day.  Has been taking linzess 72 mcg every other day.  She has been more constipated of late.  She feels like she would probably even be do better on 145.  She was doing this last month and she had about 3-day episode of nausea vomiting and diarrhea.  No other obvious precipitants.  This has subsided.    She had recent imaging of her abdomen which showed mild splenomegaly.  She does have chronic thrombocytopenia and is followed by hematology at the [facility].  Liver appeared normal.  CT also suggested constipation.  She has had previous imaging of the [facility] that showed diffuse hepatic steatosis in December 2019-there was minimal dilation of the splenic vein suggesting portal hypertension.  LFTs are normal.    She does have hx HP polyps and FH colon polyps. Her last colonoscopy was done 6/2016. Her next scope will be due 6/2021.     HPI  Past Medical History:   Diagnosis Date   • Anemia 6/2010   • Cancer (CMS/HCC)    • Colon polyp    • Diabetes mellitus (CMS/HCC) 7/2018    PRE   • Elevated cholesterol    • Fatty liver 12/19   • Gastroparesis    • GERD (gastroesophageal reflux disease)    • HX: breast cancer 2010   • Hyperlipidemia    • Internal hemorrhoid    • RLS (restless legs syndrome)    • Sleep apnea    • Thyroid disease      Past Surgical History:   Procedure Laterality Date   • BREAST LUMPECTOMY     • BREAST RECONSTRUCTION     • CHOLECYSTECTOMY     • COLONOSCOPY  2011    Per pt. [facility], diverticulitis, otherwise normal (dr. Matias)   • COLONOSCOPY N/A 6/22/2016    polyps, tics, NBIH, hyperplastic polyp x 2   • UPPER GASTROINTESTINAL ENDOSCOPY  03/17/2017    multiple fundic gland polyps       Current Outpatient Medications:   •   atorvastatin (LIPITOR) 20 MG tablet, Take 20 mg by mouth Every Night., Disp: , Rfl:   •  dexlansoprazole (Dexilant) 60 MG capsule, Take 1 capsule by mouth Every Night., Disp: 90 capsule, Rfl: 3  •  diclofenac (VOLTAREN) 1 % gel gel, Place 4 g on the skin as directed by provider., Disp: , Rfl:   •  levothyroxine (SYNTHROID, LEVOTHROID) 75 MCG tablet, Take 88 mcg by mouth Every Night., Disp: , Rfl:   •  metFORMIN (GLUCOPHAGE) 500 MG tablet, , Disp: , Rfl:   •  omega-3 acid ethyl esters (LOVAZA) 1 g capsule, Take 4 g by mouth Every Night., Disp: , Rfl:   •  Omega-3 Fatty Acids (FISH OIL) 500 MG capsule capsule, , Disp: , Rfl:   •  rOPINIRole (REQUIP) 2 MG tablet, Take 2 mg by mouth Every Night., Disp: , Rfl:   •  Semaglutide (OZEMPIC, 0.25 OR 0.5 MG/DOSE, SC), Inject 0.5 mg under the skin into the appropriate area as directed 1 (One) Time Per Week. takes on sunday, Disp: , Rfl:   •  tamoxifen (NOLVADEX) 20 MG chemo tablet, Take 20 mg by mouth Every Night., Disp: , Rfl:   •  venlafaxine XR (EFFEXOR-XR) 150 MG 24 hr capsule, Take 225 mg by mouth Every Night., Disp: , Rfl:   PRN Meds:.  No Known Allergies  Social History     Socioeconomic History   • Marital status:      Spouse name: Not on file   • Number of children: Not on file   • Years of education: Not on file   • Highest education level: Not on file   Tobacco Use   • Smoking status: Never Smoker   • Smokeless tobacco: Never Used   Substance and Sexual Activity   • Alcohol use: Yes     Comment: occasssionally   • Drug use: No   • Sexual activity: Not Currently     Partners: Male     Birth control/protection: Post-menopausal     Family History   Problem Relation Age of Onset   • Colon polyps Sister    • Arthritis Sister    • Depression Sister    • Hyperlipidemia Sister    • Colon polyps Sister    • Depression Sister    • Hyperlipidemia Sister    • Colon polyps Sister    • Depression Sister    • Hyperlipidemia Sister    • Colon polyps Sister    • Depression  Sister    • Hyperlipidemia Sister    • Depression Mother    • Alcohol abuse Father    • Arthritis Brother    • Asthma Son    • Colon polyps Sister    • Colon polyps Sister    • Colon polyps Sister    • Colon polyps Sister      Review of Systems   Constitutional: Positive for unexpected weight change. Negative for appetite change.   Gastrointestinal: Positive for constipation and nausea. Negative for abdominal pain and blood in stool.   All other systems reviewed and are negative.    Vitals:    08/12/20 1400   Temp: 97.1 °F (36.2 °C)         08/12/20  1400   Weight: 96.5 kg (212 lb 12.8 oz)       Objective   Physical Exam   Constitutional: She appears well-developed and well-nourished.   HENT:   Head: Normocephalic and atraumatic.   Eyes: No scleral icterus.   Pulmonary/Chest: Effort normal. No respiratory distress.   Abdominal: Soft. She exhibits no distension.   Neurological: She is alert.   Skin: Skin is warm and dry.   Psychiatric: She has a normal mood and affect.     No radiology results for the last 7 days    Assessment/Plan   Diagnoses and all orders for this visit:    Gastroesophageal reflux disease without esophagitis    Splenomegaly  Comments:  Possible mild portal hypertension by previous imaging    Hepatic steatosis    Chronic idiopathic constipation    Hyperplastic colonic polyp, unspecified part of colon    Gastroparesis    Other orders  -     diclofenac (VOLTAREN) 1 % gel gel; Place 4 g on the skin as directed by provider.      Plan:  · Recommend resuming Linzess 145 mcg daily-she will try this for a few weeks and the let me know her response-we will adjust her dose accordingly  · Continue Dexilant every day to every other day depending on results-she is currently well controlled on every other day dosing  · Discussed diet modification for gastroparesis  · She likely does have fatty liver and may have mild portal hypertension but certainly does not have cirrhosis by imaging.  Recommend hematology  follow-up to look for other causes of thrombocytopenia.  If she has portal hypertension it certainly mild.  Would recommend surveillance ultrasound every 1 to 2 years for follow-up.  Continue with diet, exercise.  She will complete tamoxifen in February 2021.  · She is due for repeat colonoscopy in June 2021

## 2020-09-09 ENCOUNTER — TELEPHONE (OUTPATIENT)
Dept: GASTROENTEROLOGY | Facility: CLINIC | Age: 60
End: 2020-09-09

## 2020-09-09 NOTE — TELEPHONE ENCOUNTER
----- Message from Radha Moody sent at 9/9/2020 12:17 PM EDT -----  Regarding: Prescription Question  Contact: [phone]  Hesuze Fenton,  You said to let you know about whether I needed to increase my linzess from 72 mg to a higher dose. Yes, I do. I've been taking 2 of the 72mg since my last appointment and it doesn't do anything until about  4 days later and then it isn't a whole lot. So if you could increase the dosage that would be great.     Also, I am having another episode that started yesterday with a headache,fatigue,  nausea, and belching of sulphur. Those are the only symptoms at this moment. I know you said this is the nature of my condition but I've never had this until January of this year and now it's every couple of months. I don't like this! Anyway, just wanted to let you know.

## 2020-09-10 RX ORDER — METOCLOPRAMIDE 10 MG/1
10 TABLET ORAL 2 TIMES DAILY WITH MEALS
Qty: 14 TABLET | Refills: 0 | Status: SHIPPED | OUTPATIENT
Start: 2020-09-10 | End: 2020-09-17

## 2020-09-10 NOTE — TELEPHONE ENCOUNTER
Last dose increased to 145 mcg daily    Recommend trying short course of Reglan to see if it helps with her other symptoms-I have sent this to her pharmacy that should help her stomach empty and hopefully help some of the other symptoms resolve

## 2021-03-26 ENCOUNTER — BULK ORDERING (OUTPATIENT)
Dept: CASE MANAGEMENT | Facility: OTHER | Age: 61
End: 2021-03-26

## 2021-03-26 DIAGNOSIS — Z23 IMMUNIZATION DUE: ICD-10-CM

## 2021-04-14 ENCOUNTER — TELEPHONE (OUTPATIENT)
Dept: GASTROENTEROLOGY | Facility: CLINIC | Age: 61
End: 2021-04-14

## 2021-04-14 RX ORDER — ICOSAPENT ETHYL 1000 MG/1
2 CAPSULE ORAL DAILY
COMMUNITY
Start: 2020-03-26

## 2021-04-14 NOTE — TELEPHONE ENCOUNTER
----- Message from Marian Aguiar sent at 4/13/2021  8:17 AM EDT -----  Regarding: open access  Contact: [phone]  Patient needs colonoscopy,

## 2021-04-16 ENCOUNTER — TELEPHONE (OUTPATIENT)
Dept: GASTROENTEROLOGY | Facility: CLINIC | Age: 61
End: 2021-04-16

## 2021-04-16 NOTE — TELEPHONE ENCOUNTER
Last scope 06/22/2016 in [facility]-- personal hx of polyps-- sister hx of polyps-- no ASA or blood thinners-- medications:    atorvastatin (LIPITOR) 20 MG tablet  dexlansoprazole (Dexilant) 60 MG capsule  diclofenac (VOLTAREN) 1 % gel gel  icosapent ethyl (Vascepa) 1 g capsule capsule  levothyroxine (SYNTHROID, LEVOTHROID) 75 MCG tablet  linaclotide (LINZESS) 145 MCG capsule capsule  metFORMIN (GLUCOPHAGE) 500 MG tablet  omega-3 acid ethyl esters (LOVAZA) 1 g capsule  Omega-3 Fatty Acids (FISH OIL) 500 MG capsule capsule  rOPINIRole (REQUIP) 2 MG tablet  Semaglutide (OZEMPIC, 0.25 OR 0.5 MG/DOSE, SC)  Prozac     OA form scanned into media

## 2021-04-19 ENCOUNTER — PREP FOR SURGERY (OUTPATIENT)
Dept: OTHER | Facility: HOSPITAL | Age: 61
End: 2021-04-19

## 2021-04-19 DIAGNOSIS — Z86.010 HISTORY OF COLONIC POLYPS: ICD-10-CM

## 2021-04-19 DIAGNOSIS — Z83.71 FAMILY HISTORY OF POLYPS IN THE COLON: Primary | ICD-10-CM

## 2021-05-03 ENCOUNTER — PREP FOR SURGERY (OUTPATIENT)
Dept: OTHER | Facility: HOSPITAL | Age: 61
End: 2021-05-03

## 2021-05-10 NOTE — TELEPHONE ENCOUNTER
----- Message from Deanne Johnson RN sent at 5/10/2021  1:24 PM EDT -----  Regarding: FW: Non-Urgent Medical Question  Contact: [phone]    ----- Message -----  From: Radha Moody  Sent: 5/10/2021   9:00 AM EDT  To: [facility]  Subject: Non-Urgent Medical Question                      Yina,  I have been trying to reschedule my colonoscopy, but I can't seem to get any help. Everyone says someone will call me but I haven't heard anything. Can we change it to after June 19th? Also, I've called 3 times to make sure Dr. Fenton has added the upper GI but no response. Please let me know when I can reschedule    Radha Moody

## 2021-06-03 ENCOUNTER — TELEPHONE (OUTPATIENT)
Dept: GASTROENTEROLOGY | Facility: CLINIC | Age: 61
End: 2021-06-03

## 2021-06-03 NOTE — TELEPHONE ENCOUNTER
----- Message from Radha Moody sent at 6/3/2021  1:13 PM EDT -----  Regarding: Non-Urgent Medical Question  Contact: [phone]  THE LAST TIME I HAD A COLONOSCOPY DR. HERRERA SAID SHE WAS GOING TO HAVE ME DO A DIFFERENT PREP, NOT THE ONE THAT WAS LISTED ON MY ORDERS. WHATEVER, I DID LAST TIME DID NOT WORK AS WELL. PLEASE ADVISE

## 2021-07-02 ENCOUNTER — OUTSIDE FACILITY SERVICE (OUTPATIENT)
Dept: GASTROENTEROLOGY | Facility: CLINIC | Age: 61
End: 2021-07-02

## 2021-07-02 PROCEDURE — 45378 DIAGNOSTIC COLONOSCOPY: CPT | Performed by: INTERNAL MEDICINE

## 2021-07-02 PROCEDURE — 43239 EGD BIOPSY SINGLE/MULTIPLE: CPT | Performed by: INTERNAL MEDICINE

## 2021-07-02 RX ORDER — SUCRALFATE 1 G/1
1 TABLET ORAL 3 TIMES DAILY
Qty: 90 TABLET | Refills: 1 | Status: SHIPPED | OUTPATIENT
Start: 2021-07-02 | End: 2022-10-26 | Stop reason: SDUPTHER

## 2021-07-12 NOTE — PROGRESS NOTES
Inflammation was seen on gastric biopsy with erosive change.  Avoid NSAIDs.  Continue Dexilant and add Carafate for 6 to 8 weeks.    Please put in recall for 5-year repeat for colonoscopy

## 2021-07-14 DIAGNOSIS — K21.9 GASTROESOPHAGEAL REFLUX DISEASE WITHOUT ESOPHAGITIS: ICD-10-CM

## 2021-07-14 RX ORDER — DEXLANSOPRAZOLE 60 MG/1
CAPSULE, DELAYED RELEASE ORAL
Qty: 90 CAPSULE | Refills: 3 | Status: SHIPPED | OUTPATIENT
Start: 2021-07-14 | End: 2022-10-26 | Stop reason: DRUGHIGH

## 2021-08-12 ENCOUNTER — TELEPHONE (OUTPATIENT)
Dept: GASTROENTEROLOGY | Facility: CLINIC | Age: 61
End: 2021-08-12

## 2021-08-15 NOTE — TELEPHONE ENCOUNTER
----- Message from Karolina Fenton MD sent at 7/12/2021  8:20 AM EDT -----  Inflammation was seen on gastric biopsy with erosive change.  Avoid NSAIDs.  Continue Dexilant and add Carafate for 6 to 8 weeks.    Please put in recall for 5-year repeat for colonoscopy  
Call to pt.  Advise per DR Fenton note.  Verb understanding.   
Called pt and left  for pt to call back.    C/s placed in recall and  for 07/02/2026  
 used

## 2022-02-09 RX ORDER — LINACLOTIDE 145 UG/1
CAPSULE, GELATIN COATED ORAL
Qty: 90 CAPSULE | Refills: 1 | Status: SHIPPED | OUTPATIENT
Start: 2022-02-09 | End: 2022-11-28 | Stop reason: SINTOL

## 2022-02-10 ENCOUNTER — TELEPHONE (OUTPATIENT)
Dept: GASTROENTEROLOGY | Facility: CLINIC | Age: 62
End: 2022-02-10

## 2022-02-10 NOTE — TELEPHONE ENCOUNTER
"Faxed request received from [employer] for 90 day supply of sucralfate 1 gm - take 1 tab by mouth 3x/day (dissolve in warm water and drink as a slurry).      See note of 8/12/21: \"  Continue Dexilant and add Carafate for 6 to 8 weeks.\"      Call to pt.  Did not request refill.  Denial faxed to [phone].  Confirmation received.   "

## 2022-02-24 ENCOUNTER — APPOINTMENT (OUTPATIENT)
Dept: WOMENS IMAGING | Facility: HOSPITAL | Age: 62
End: 2022-02-24

## 2022-02-24 PROCEDURE — 77080 DXA BONE DENSITY AXIAL: CPT | Performed by: RADIOLOGY

## 2022-10-01 DIAGNOSIS — K21.9 GASTROESOPHAGEAL REFLUX DISEASE WITHOUT ESOPHAGITIS: ICD-10-CM

## 2022-10-03 RX ORDER — DEXLANSOPRAZOLE 60 MG/1
CAPSULE, DELAYED RELEASE ORAL
Qty: 90 CAPSULE | Refills: 3 | OUTPATIENT
Start: 2022-10-03

## 2022-10-14 ENCOUNTER — HOSPITAL ENCOUNTER (OUTPATIENT)
Dept: GENERAL RADIOLOGY | Facility: HOSPITAL | Age: 62
Discharge: HOME | End: 2022-10-14
Admitting: OBSTETRICS & GYNECOLOGY

## 2022-10-14 DIAGNOSIS — K59.01 SLOW TRANSIT CONSTIPATION: ICD-10-CM

## 2022-10-14 PROCEDURE — 74018 RADEX ABDOMEN 1 VIEW: CPT

## 2022-10-19 ENCOUNTER — HOSPITAL ENCOUNTER (OUTPATIENT)
Dept: GENERAL RADIOLOGY | Facility: HOSPITAL | Age: 62
Discharge: HOME | End: 2022-10-19
Admitting: OBSTETRICS & GYNECOLOGY

## 2022-10-19 DIAGNOSIS — K59.01 SLOW TRANSIT CONSTIPATION: ICD-10-CM

## 2022-10-19 PROCEDURE — 74018 RADEX ABDOMEN 1 VIEW: CPT

## 2022-10-26 ENCOUNTER — OFFICE VISIT (OUTPATIENT)
Dept: GASTROENTEROLOGY | Facility: CLINIC | Age: 62
End: 2022-10-26

## 2022-10-26 VITALS
WEIGHT: 221.4 LBS | SYSTOLIC BLOOD PRESSURE: 130 MMHG | TEMPERATURE: 97.5 F | DIASTOLIC BLOOD PRESSURE: 82 MMHG | BODY MASS INDEX: 31.7 KG/M2 | HEIGHT: 70 IN | HEART RATE: 82 BPM

## 2022-10-26 DIAGNOSIS — K21.9 GASTROESOPHAGEAL REFLUX DISEASE WITHOUT ESOPHAGITIS: Primary | ICD-10-CM

## 2022-10-26 DIAGNOSIS — Z86.010 PERSONAL HISTORY OF COLONIC POLYPS: ICD-10-CM

## 2022-10-26 DIAGNOSIS — K59.04 CHRONIC IDIOPATHIC CONSTIPATION: ICD-10-CM

## 2022-10-26 DIAGNOSIS — K31.84 GASTROPARESIS: ICD-10-CM

## 2022-10-26 PROCEDURE — 99214 OFFICE O/P EST MOD 30 MIN: CPT | Performed by: PHYSICIAN ASSISTANT

## 2022-10-26 RX ORDER — FLUOXETINE HYDROCHLORIDE 40 MG/1
40 CAPSULE ORAL NIGHTLY
COMMUNITY
Start: 2022-09-27

## 2022-10-26 RX ORDER — BREXPIPRAZOLE 1 MG/1
1 TABLET ORAL NIGHTLY
COMMUNITY

## 2022-10-26 RX ORDER — SUCRALFATE 1 G/1
1 TABLET ORAL 3 TIMES DAILY
Qty: 90 TABLET | Refills: 1 | Status: SHIPPED | OUTPATIENT
Start: 2022-10-26 | End: 2023-03-02

## 2022-10-26 RX ORDER — FERROUS SULFATE 325(65) MG
325 TABLET ORAL
COMMUNITY
Start: 2022-08-01

## 2022-10-26 RX ORDER — DEXLANSOPRAZOLE 60 MG/1
1 CAPSULE, DELAYED RELEASE ORAL NIGHTLY
Qty: 90 CAPSULE | Refills: 3 | Status: CANCELLED | OUTPATIENT
Start: 2022-10-26

## 2022-10-26 RX ORDER — FAMOTIDINE 40 MG/1
40 TABLET, FILM COATED ORAL DAILY
Qty: 30 TABLET | Refills: 5 | Status: SHIPPED | OUTPATIENT
Start: 2022-10-26 | End: 2022-11-28

## 2022-10-26 NOTE — PROGRESS NOTES
" Chief Complaint  Heartburn    Subjective          History of Present Illness    Bethanie Moody is a  62 y.o. female presents for follow-up on GERD/gastritis and gastroparesis.    She previously took dexilant but has not taken this regularly recently.  She takes as needed.  She reports GERD about 2 times a week.  Also some intermittent epigastric discomfort.  She denies nausea, vomiting, melena, dysphagia.  She asked for refill of her sucralfate.    She has a history of chronic constipation and recently diagnosed with stage 2 rectal prolapse by Dr. Martins. She is working with her on this. Linzess 145mcg QD causes stool leakage and incontinence.     7/2/2021 EGD showed moderate erosive gastritis.  Pathology negative for H. pylori.    Colonoscopy at the same time showed tortuous colon, nonbleeding internal hemorrhoids.  Recall 5 years.  She is a personal and family history of colon polyps.    Objective   Vital Signs:   /82   Pulse 82   Temp 97.5 °F (36.4 °C)   Ht 177.8 cm (70\")   Wt 100 kg (221 lb 6.4 oz)   BMI 31.77 kg/m²       Physical Exam  Vitals reviewed.   Constitutional:       General: She is awake. She is not in acute distress.     Appearance: Normal appearance. She is well-developed and well-groomed.   HENT:      Head: Normocephalic.   Pulmonary:      Effort: Pulmonary effort is normal. No respiratory distress.   Skin:     Coloration: Skin is not pale.   Neurological:      Mental Status: She is alert and oriented to person, place, and time.      Gait: Gait is intact.   Psychiatric:         Mood and Affect: Mood and affect normal.         Speech: Speech normal.         Behavior: Behavior is cooperative.         Judgment: Judgment normal.          Result Review :             Assessment and Plan    Diagnoses and all orders for this visit:    1. Gastroesophageal reflux disease without esophagitis (Primary)    2. Chronic idiopathic constipation    3. Gastroparesis    4. Personal history of colonic " polyps    Other orders  -     famotidine (PEPCID) 40 MG tablet; Take 1 tablet by mouth Daily.  Dispense: 30 tablet; Refill: 5  -     sucralfate (Carafate) 1 g tablet; Take 1 tablet by mouth 3 (Three) Times a Day. Dissolve in warm water and drink as a slurry  Dispense: 90 tablet; Refill: 1    Recommend trial of pepcid 40 mg daily for chronic GERD.  She is concerned with the side effects of daily PPI use which we discussed. If Pepcid is not working for her, would recommend trial of low-dose PPI daily. I also recommended strict GERD diet.  Refilled sucralfate which she can take as needed.    She has chronic constipation and apparently diagnosed recently with stage II rectal prolapse by Dr. Martisn.  Linzess 145 mcg daily is too much and causing stool incontinence.  I gave her samples of Linzess 72mcg to try.  Continue to follow with Dr. Martins as she is working with Mrs. Moody on multiple things to try to improve her pelvic floor dysfunction.  She will let us know if we are needed.      Follow Up   Return in about 6 months (around 4/26/2023) for Dr. Fenton or Rosemary.    Noah dictation used throughout this note.     Rosemary Dale PA-C

## 2022-11-28 RX ORDER — PANTOPRAZOLE SODIUM 40 MG/1
40 TABLET, DELAYED RELEASE ORAL DAILY
Qty: 90 TABLET | Refills: 1 | Status: SHIPPED | OUTPATIENT
Start: 2022-11-28

## 2023-02-16 ENCOUNTER — PREP FOR SURGERY (OUTPATIENT)
Dept: OTHER | Facility: HOSPITAL | Age: 63
End: 2023-02-16
Payer: COMMERCIAL

## 2023-02-16 DIAGNOSIS — N81.6 RECTOCELE: ICD-10-CM

## 2023-02-16 DIAGNOSIS — N81.89 LOSS OF PERINEAL BODY, FEMALE: ICD-10-CM

## 2023-02-16 DIAGNOSIS — N99.3 PROLAPSE OF VAGINAL VAULT AFTER HYSTERECTOMY: Primary | ICD-10-CM

## 2023-02-16 DIAGNOSIS — K59.01 SLOW TRANSIT CONSTIPATION: ICD-10-CM

## 2023-02-16 DIAGNOSIS — R15.0 INCOMPLETE DEFECATION: ICD-10-CM

## 2023-02-16 DIAGNOSIS — N39.3 FEMALE STRESS INCONTINENCE: ICD-10-CM

## 2023-02-16 DIAGNOSIS — R15.9 FECAL INCONTINENCE: ICD-10-CM

## 2023-02-16 DIAGNOSIS — N81.11 CYSTOCELE, MIDLINE: ICD-10-CM

## 2023-02-16 DIAGNOSIS — N81.5 VAGINAL ENTEROCELE: ICD-10-CM

## 2023-02-16 RX ORDER — SODIUM CHLORIDE 9 MG/ML
40 INJECTION, SOLUTION INTRAVENOUS AS NEEDED
Status: CANCELLED | OUTPATIENT
Start: 2023-03-24

## 2023-02-16 RX ORDER — SODIUM CHLORIDE 0.9 % (FLUSH) 0.9 %
10 SYRINGE (ML) INJECTION EVERY 12 HOURS SCHEDULED
Status: CANCELLED | OUTPATIENT
Start: 2023-03-24

## 2023-02-16 RX ORDER — SODIUM CHLORIDE 0.9 % (FLUSH) 0.9 %
10 SYRINGE (ML) INJECTION AS NEEDED
Status: CANCELLED | OUTPATIENT
Start: 2023-03-24

## 2023-02-16 RX ORDER — PHENAZOPYRIDINE HYDROCHLORIDE 200 MG/1
200 TABLET, FILM COATED ORAL ONCE
Status: CANCELLED | OUTPATIENT
Start: 2023-03-24 | End: 2023-02-16

## 2023-02-16 RX ORDER — CEFAZOLIN SODIUM 2 G/100ML
2 INJECTION, SOLUTION INTRAVENOUS ONCE
Status: CANCELLED | OUTPATIENT
Start: 2023-03-24 | End: 2023-02-16

## 2023-03-02 ENCOUNTER — PRE-ADMISSION TESTING (OUTPATIENT)
Dept: PREADMISSION TESTING | Facility: HOSPITAL | Age: 63
End: 2023-03-02
Payer: COMMERCIAL

## 2023-03-02 VITALS
DIASTOLIC BLOOD PRESSURE: 75 MMHG | RESPIRATION RATE: 18 BRPM | WEIGHT: 224 LBS | TEMPERATURE: 98.2 F | HEART RATE: 78 BPM | SYSTOLIC BLOOD PRESSURE: 137 MMHG | OXYGEN SATURATION: 98 % | BODY MASS INDEX: 32.07 KG/M2 | HEIGHT: 70 IN

## 2023-03-02 DIAGNOSIS — Z01.812 ENCOUNTER FOR PRE-OPERATIVE LABORATORY TESTING: Primary | ICD-10-CM

## 2023-03-02 LAB
ANION GAP SERPL CALCULATED.3IONS-SCNC: 8 MMOL/L (ref 5–15)
BUN SERPL-MCNC: 19 MG/DL (ref 8–23)
BUN/CREAT SERPL: 18.1 (ref 7–25)
CALCIUM SPEC-SCNC: 9.2 MG/DL (ref 8.6–10.5)
CHLORIDE SERPL-SCNC: 104 MMOL/L (ref 98–107)
CO2 SERPL-SCNC: 28 MMOL/L (ref 22–29)
CREAT SERPL-MCNC: 1.05 MG/DL (ref 0.57–1)
DEPRECATED RDW RBC AUTO: 44.5 FL (ref 37–54)
EGFRCR SERPLBLD CKD-EPI 2021: 60.6 ML/MIN/1.73
ERYTHROCYTE [DISTWIDTH] IN BLOOD BY AUTOMATED COUNT: 14.3 % (ref 12.3–15.4)
GLUCOSE SERPL-MCNC: 189 MG/DL (ref 65–99)
HCT VFR BLD AUTO: 37.6 % (ref 34–46.6)
HGB BLD-MCNC: 12.4 G/DL (ref 12–15.9)
HOLD SPECIMEN: NORMAL
MCH RBC QN AUTO: 28.1 PG (ref 26.6–33)
MCHC RBC AUTO-ENTMCNC: 33 G/DL (ref 31.5–35.7)
MCV RBC AUTO: 85.3 FL (ref 79–97)
PLATELET # BLD AUTO: 94 10*3/MM3 (ref 140–450)
PMV BLD AUTO: 10.8 FL (ref 6–12)
POTASSIUM SERPL-SCNC: 3.9 MMOL/L (ref 3.5–5.2)
QT INTERVAL: 400 MS
RBC # BLD AUTO: 4.41 10*6/MM3 (ref 3.77–5.28)
SODIUM SERPL-SCNC: 140 MMOL/L (ref 136–145)
WBC NRBC COR # BLD: 6.11 10*3/MM3 (ref 3.4–10.8)

## 2023-03-02 PROCEDURE — 93005 ELECTROCARDIOGRAM TRACING: CPT

## 2023-03-02 PROCEDURE — 93010 ELECTROCARDIOGRAM REPORT: CPT | Performed by: INTERNAL MEDICINE

## 2023-03-02 PROCEDURE — 85027 COMPLETE CBC AUTOMATED: CPT

## 2023-03-02 PROCEDURE — 80048 BASIC METABOLIC PNL TOTAL CA: CPT

## 2023-03-02 PROCEDURE — 36415 COLL VENOUS BLD VENIPUNCTURE: CPT

## 2023-03-02 RX ORDER — CHLORHEXIDINE GLUCONATE 500 MG/1
CLOTH TOPICAL TAKE AS DIRECTED
Status: ON HOLD | COMMUNITY
End: 2023-03-21

## 2023-03-02 NOTE — DISCHARGE INSTRUCTIONS
Take the following medications the morning of surgery: NONE    ARRIVE  AM ON 3/24/23    If you are on prescription narcotic pain medication to control your pain you may also take that medication the morning of surgery.    General Instructions:  Do not eat solid food after midnight the night before surgery.  You may drink clear liquids day of surgery but must stop at least one hour before your hospital arrival time.  It is beneficial for you to have a clear drink that contains carbohydrates the day of surgery.  We suggest a 12 to 20 ounce bottle of Gatorade or Powerade for non-diabetic patients or a 12 to 20 ounce bottle of G2 or Powerade Zero for diabetic patients. (Pediatric patients, are not advised to drink a 12 to 20 ounce carbohydrate drink)    Clear liquids are liquids you can see through.  Nothing red in color.     Plain water                               Sports drinks  Sodas                                   Gelatin (Jell-O)  Fruit juices without pulp such as white grape juice and apple juice  Popsicles that contain no fruit or yogurt  Tea or coffee (no cream or milk added)  Gatorade / Powerade  G2 / Powerade Zero    Infants may have breast milk up to four hours before surgery.  Infants drinking formula may drink formula up to six hours before surgery.   Patients who avoid smoking, chewing tobacco and alcohol for 4 weeks prior to surgery have a reduced risk of post-operative complications.  Quit smoking as many days before surgery as you can.  Do not smoke, use chewing tobacco or drink alcohol the day of surgery.   If applicable bring your C-PAP/ BI-PAP machine.  Bring any papers given to you in the doctor’s office.  Wear clean comfortable clothes.  Do not wear contact lenses, false eyelashes or make-up.  Bring a case for your glasses.   Bring crutches or walker if applicable.  Remove all piercings.  Leave jewelry and any other valuables at home.  Hair extensions with metal clips must be removed prior  to surgery.  The Pre-Admission Testing nurse will instruct you to bring medications if unable to obtain an accurate list in Pre-Admission Testing.        If you were given a blood bank ID arm band remember to bring it with you the day of surgery.    Preventing a Surgical Site Infection:  For 2 to 3 days before surgery, avoid shaving with a razor because the razor can irritate skin and make it easier to develop an infection.    Any areas of open skin can increase the risk of a post-operative wound infection by allowing bacteria to enter and travel throughout the body.  Notify your surgeon if you have any skin wounds / rashes even if it is not near the expected surgical site.  The area will need assessed to determine if surgery should be delayed until it is healed.  The night prior to surgery shower using a fresh bar of anti-bacterial soap (such as Dial) and clean washcloth.  Sleep in a clean bed with clean clothing.  Do not allow pets to sleep with you.  Shower on the morning of surgery using a fresh bar of anti-bacterial soap (such as Dial) and clean washcloth.  Dry with a clean towel and dress in clean clothing.  Ask your surgeon if you will be receiving antibiotics prior to surgery.  Make sure you, your family, and all healthcare providers clean their hands with soap and water or an alcohol based hand  before caring for you or your wound.    Day of surgery:  Your arrival time is approximately two hours before your scheduled surgery time.  Upon arrival, a Pre-op nurse and Anesthesiologist will review your health history, obtain vital signs, and answer questions you may have.  The only belongings needed at this time will be a list of your home medications and if applicable your C-PAP/BI-PAP machine.  A Pre-op nurse will start an IV and you may receive medication in preparation for surgery, including something to help you relax.     Please be aware that surgery does come with discomfort.  We want to make  every effort to control your discomfort so please discuss any uncontrolled symptoms with your nurse.   Your doctor will most likely have prescribed pain medications.      If you are going home after surgery you will receive individualized written care instructions before being discharged.  A responsible adult must drive you to and from the hospital on the day of your surgery and stay with you for 24 hours.  Discharge prescriptions can be filled by the hospital pharmacy during regular pharmacy hours.  If you are having surgery late in the day/evening your prescription may be e-prescribed to your pharmacy.  Please verify your pharmacy hours or chose a 24 hour pharmacy to avoid not having access to your prescription because your pharmacy has closed for the day.    If you are staying overnight following surgery, you will be transported to your hospital room following the recovery period.  [facility] has all private rooms.    If you have any questions please call Pre-Admission Testing at [phone].  Deductibles and co-payments are collected on the day of service. Please be prepared to pay the required co-pay, deductible or deposit on the day of service as defined by your plan.    Call your surgeon immediately if you experience any of the following symptoms:  Sore Throat  Shortness of Breath or difficulty breathing  Cough  Chills  Body soreness or muscle pain  Headache  Fever  New loss of taste or smell  Do not arrive for your surgery ill.  Your procedure will need to be rescheduled to another time.  You will need to call your physician before the day of surgery to avoid any unnecessary exposure to hospital staff as well as other patients.   CHLORHEXIDINE CLOTH INSTRUCTIONS  The morning of surgery follow these instructions using the Chlorhexidine cloths you've been given.  These steps reduce bacteria on the body.  Do not use the cloths near your eyes, ears mouth, genitalia or on open wounds.  Throw the  cloths away after use but do not try to flush them down a toilet.      Open and remove one cloth at a time from the package.    Leave the cloth unfolded and begin the bathing.  Massage the skin with the cloths using gentle pressure to remove bacteria.  Do not scrub harshly.   Follow the steps below with one 2% CHG cloth per area (6 total cloths).  One cloth for neck, shoulders and chest.  One cloth for both arms, hands, fingers and underarms (do underarms last).  One cloth for the abdomen followed by groin.  One cloth for right leg and foot including between the toes.  One cloth for left leg and foot including between the toes.  The last cloth is to be used for the back of the neck, back and buttocks.    Allow the CHG to air dry 3 minutes on the skin which will give it time to work and decrease the chance of irritation.  The skin may feel sticky until it is dry.  Do not rinse with water or any other liquid or you will lose the beneficial effects of the CHG.  If mild skin irritation occurs, do rinse the skin to remove the CHG.  Report this to the nurse at time of admission.  Do not apply lotions, creams, ointments, deodorants or perfumes after using the clothes. Dress in clean clothes before coming to the hospital.

## 2023-03-14 RX ORDER — OXYCODONE HYDROCHLORIDE AND ACETAMINOPHEN 5; 325 MG/1; MG/1
1 TABLET ORAL EVERY 4 HOURS PRN
Qty: 20 TABLET | Refills: 0 | Status: SHIPPED | OUTPATIENT
Start: 2023-03-24 | End: 2023-04-13

## 2023-03-14 NOTE — H&P
Female Pelvic Medicine and Reconstructive Surgery   History & Physical    Patient Identification:  Name: Bethanie Moody Age: 62 y.o. Sex: female :  1960 MRN: Female Pelvic Medicine and Reconstructive Surgery   History & Physical    Patient Identification:  Name: Bethanie Moody Age: 62 y.o. Sex: female :  1960 MRN: [id number]                            Problem List:    Prolapse of vaginal vault after hysterectomy    Cystocele, midline    Vaginal enterocele    Rectocele    Loss of perineal body, female    Female stress incontinence    Slow transit constipation    Fecal incontinence    Incomplete defecation    Past Medical History:  Past Medical History:   Diagnosis Date   • Anemia 2010   • Cancer (HCC)    • Colon polyp    • Depression    • Diabetes mellitus (HCC) 2018    PRE   • Elevated cholesterol    • Fatty liver 2019   • Gastroparesis    • GERD (gastroesophageal reflux disease)    • History of uterine cancer    • HX: breast cancer    • Hyperlipidemia    • Internal hemorrhoid    • PONV (postoperative nausea and vomiting)    • RLS (restless legs syndrome)    • Sleep apnea    • Thyroid disease    • Urinary urgency      Past Surgical History:  Past Surgical History:   Procedure Laterality Date   • BREAST LUMPECTOMY     • BREAST RECONSTRUCTION     • CHOLECYSTECTOMY     • COLONOSCOPY      Per pt. IH, diverticulitis, otherwise normal (dr. Matias)   • COLONOSCOPY N/A 2016    polyps, tics, NBIH, hyperplastic polyp x 2   • HYSTERECTOMY  3/29/21   • UPPER GASTROINTESTINAL ENDOSCOPY  2017    multiple fundic gland polyps      Home Meds:  No medications prior to admission.     Current Meds:   No current facility-administered medications for this encounter.    Current Outpatient Medications:   •  atorvastatin (LIPITOR) 80 MG tablet, Take 1 tablet by mouth Every Night., Disp: , Rfl:   •  Brexpiprazole (Rexulti) 1 MG tablet, Take 1 mg by mouth Every Night., Disp: , Rfl:   •   Chlorhexidine Gluconate Cloth 2 % pads, Apply  topically Take As Directed., Disp: , Rfl:   •  ferrous sulfate 325 (65 FE) MG tablet, Take 1 tablet by mouth Daily With Breakfast., Disp: , Rfl:   •  FLUoxetine (PROzac) 40 MG capsule, Take 1 capsule by mouth Every Night., Disp: , Rfl:   •  icosapent ethyl (VASCEPA) 1 g capsule capsule, Take 2 g by mouth Daily., Disp: , Rfl:   •  levothyroxine (SYNTHROID, LEVOTHROID) 88 MCG tablet, Take 1 tablet by mouth Every Night., Disp: , Rfl:   •  linaclotide (Linzess) 72 MCG capsule capsule, Take 1 capsule by mouth Every Morning Before Breakfast. (Patient taking differently: Take 1 capsule by mouth As Needed.), Disp: 90 capsule, Rfl: 1  •  metFORMIN (GLUCOPHAGE) 500 MG tablet, Take 1 tablet by mouth Daily With Breakfast., Disp: , Rfl:   •  pantoprazole (PROTONIX) 40 MG EC tablet, Take 1 tablet by mouth Daily. (Patient taking differently: Take 1 tablet by mouth Every Night.), Disp: 90 tablet, Rfl: 1  •  rOPINIRole (REQUIP) 2 MG tablet, Take 1 tablet by mouth Every Night., Disp: , Rfl:   •  Semaglutide (OZEMPIC, 0.25 OR 0.5 MG/DOSE, SC), Inject 0.5 mg under the skin into the appropriate area as directed 1 (One) Time Per Week. takes on sunday, Disp: , Rfl:   Allergies:  No Known Allergies  Immunizations:  There is no immunization history for the selected administration types on file for this patient.  Social History:   Social History     Tobacco Use   • Smoking status: Never   • Smokeless tobacco: Never   Substance Use Topics   • Alcohol use: Not Currently     Alcohol/week: 1.0 standard drink     Types: 1 Cans of beer per week     Comment: occasssionally      Family History:  Family History   Problem Relation Age of Onset   • Depression Mother    • Alcohol abuse Father    • Colon polyps Sister    • Arthritis Sister    • Depression Sister    • Hyperlipidemia Sister    • Colon polyps Sister    • Depression Sister    • Hyperlipidemia Sister    • Colon polyps Sister    • Depression  Sister    • Hyperlipidemia Sister    • Colon polyps Sister    • Depression Sister    • Hyperlipidemia Sister    • Colon polyps Sister    • Colon polyps Sister    • Colon polyps Sister    • Colon polyps Sister    • Arthritis Brother    • Asthma Son    • Malig Hyperthermia Neg Hx         Review of Systems  Constitutional:  Denies fever or chills   Eyes:  Denies change in visual acuity   HEENT:  Denies nasal congestion or sore throat   Respiratory:  Denies cough or shortness of breath   Cardiovascular:  Denies chest pain or edema   GI:  Denies abdominal pain, nausea, vomiting, bloody stools or diarrhea   :  Denies dysuria   Musculoskeletal:  Denies back pain or joint pain   Integument:  Denies rash   Neurologic:  Denies headache, focal weakness or sensory changes   Endocrine:  Denies polyuria or polydipsia   Lymphatic:  Denies swollen glands   Psychiatric:  Denies depression or anxiety       Objective:  tMax 24 hrs: No data recorded.    Vitals Ranges:        Exam:  Vital Signs: LMP 06/22/2010   Constitutional:  Well developed, well nourished, no acute distress, non-toxic appearance    GI:  Soft, nondistended, normal bowel sounds, nontender, no organomegaly, no mass, no rebound, no guarding   :  No costovertebral angle tenderness   Musculoskeletal:  No edema, no tenderness, no deformities. Back- no tenderness  Integument:  Well hydrated, no rash   Lymphatic:  No lymphadenopathy noted   Neurologic:  Alert & oriented x 3,  Pelvic:     POPQ      Assessment:    Prolapse of vaginal vault after hysterectomy    Cystocele, midline    Vaginal enterocele    Rectocele    Loss of perineal body, female    Female stress incontinence    Slow transit constipation    Fecal incontinence    Incomplete defecation    Plan:  Anterior colporrhaphy   Posterior colporrhaphy   Enterocele repair   Pubovaginal sling   Intraperitoneal uterosacral ligament vaginal colpopexy   Extraperitoneal vaginal colpopexy sacrospinous ligament fixation    Insertion of vaginal grafts   Cystourethroscopy with bladder instillation    Leila Martins MD  3/14/2023

## 2023-03-14 NOTE — PLAN OF CARE
Anterior colporrhaphy   Posterior colporrhaphy   Enterocele repair   Pubovaginal sling   Intraperitoneal uterosacral ligament vaginal colpopexy   Extraperitoneal vaginal colpopexy sacrospinous ligament fixation   Insertion of vaginal grafts   Cystourethroscopy with bladder instillation

## 2023-03-21 ENCOUNTER — ANESTHESIA (OUTPATIENT)
Dept: PERIOP | Facility: HOSPITAL | Age: 63
End: 2023-03-21
Payer: COMMERCIAL

## 2023-03-21 ENCOUNTER — HOSPITAL ENCOUNTER (OUTPATIENT)
Facility: HOSPITAL | Age: 63
Discharge: HOME | End: 2023-03-22
Attending: OBSTETRICS & GYNECOLOGY | Admitting: OBSTETRICS & GYNECOLOGY
Payer: COMMERCIAL

## 2023-03-21 ENCOUNTER — ANESTHESIA EVENT (OUTPATIENT)
Dept: PERIOP | Facility: HOSPITAL | Age: 63
End: 2023-03-21
Payer: COMMERCIAL

## 2023-03-21 DIAGNOSIS — K59.01 SLOW TRANSIT CONSTIPATION: ICD-10-CM

## 2023-03-21 DIAGNOSIS — R15.0 INCOMPLETE DEFECATION: Primary | ICD-10-CM

## 2023-03-21 DIAGNOSIS — N81.11 CYSTOCELE, MIDLINE: ICD-10-CM

## 2023-03-21 DIAGNOSIS — N81.5 VAGINAL ENTEROCELE: ICD-10-CM

## 2023-03-21 DIAGNOSIS — R15.9 FECAL INCONTINENCE: ICD-10-CM

## 2023-03-21 DIAGNOSIS — N99.3 PROLAPSE OF VAGINAL VAULT AFTER HYSTERECTOMY: ICD-10-CM

## 2023-03-21 DIAGNOSIS — R15.9 FULL INCONTINENCE OF FECES: ICD-10-CM

## 2023-03-21 DIAGNOSIS — K59.04 CHRONIC IDIOPATHIC CONSTIPATION: ICD-10-CM

## 2023-03-21 DIAGNOSIS — N81.89 LOSS OF PERINEAL BODY, FEMALE: ICD-10-CM

## 2023-03-21 DIAGNOSIS — N81.6 RECTOCELE: ICD-10-CM

## 2023-03-21 DIAGNOSIS — N39.3 FEMALE STRESS INCONTINENCE: ICD-10-CM

## 2023-03-21 LAB
ABO GROUP BLD: NORMAL
BLD GP AB SCN SERPL QL: NEGATIVE
GLUCOSE BLDC GLUCOMTR-MCNC: 137 MG/DL (ref 70–130)
GLUCOSE BLDC GLUCOMTR-MCNC: 212 MG/DL (ref 70–130)
RH BLD: POSITIVE
T&S EXPIRATION DATE: NORMAL

## 2023-03-21 PROCEDURE — 86901 BLOOD TYPING SEROLOGIC RH(D): CPT | Performed by: OBSTETRICS & GYNECOLOGY

## 2023-03-21 PROCEDURE — 25010000002 CEFAZOLIN IN DEXTROSE 2-4 GM/100ML-% SOLUTION: Performed by: OBSTETRICS & GYNECOLOGY

## 2023-03-21 PROCEDURE — 25010000002 FENTANYL CITRATE (PF) 50 MCG/ML SOLUTION: Performed by: NURSE ANESTHETIST, CERTIFIED REGISTERED

## 2023-03-21 PROCEDURE — G0378 HOSPITAL OBSERVATION PER HR: HCPCS

## 2023-03-21 PROCEDURE — 86850 RBC ANTIBODY SCREEN: CPT | Performed by: OBSTETRICS & GYNECOLOGY

## 2023-03-21 PROCEDURE — 25010000002 DEXAMETHASONE SODIUM PHOSPHATE 20 MG/5ML SOLUTION: Performed by: NURSE ANESTHETIST, CERTIFIED REGISTERED

## 2023-03-21 PROCEDURE — 82962 GLUCOSE BLOOD TEST: CPT

## 2023-03-21 PROCEDURE — 25010000002 MORPHINE PER 10 MG: Performed by: OBSTETRICS & GYNECOLOGY

## 2023-03-21 PROCEDURE — 86900 BLOOD TYPING SEROLOGIC ABO: CPT | Performed by: OBSTETRICS & GYNECOLOGY

## 2023-03-21 PROCEDURE — 25010000002 ONDANSETRON PER 1 MG: Performed by: NURSE ANESTHETIST, CERTIFIED REGISTERED

## 2023-03-21 PROCEDURE — 25010000002 PROPOFOL 10 MG/ML EMULSION: Performed by: NURSE ANESTHETIST, CERTIFIED REGISTERED

## 2023-03-21 PROCEDURE — 25010000002 CEFAZOLIN PER 500 MG: Performed by: OBSTETRICS & GYNECOLOGY

## 2023-03-21 PROCEDURE — 25010000002 CEFAZOLIN PER 500 MG: Performed by: NURSE ANESTHETIST, CERTIFIED REGISTERED

## 2023-03-21 PROCEDURE — 25010000002 HEPARIN (PORCINE) PER 1000 UNITS: Performed by: OBSTETRICS & GYNECOLOGY

## 2023-03-21 PROCEDURE — 25010000002 HYDROMORPHONE 1 MG/ML SOLUTION: Performed by: NURSE ANESTHETIST, CERTIFIED REGISTERED

## 2023-03-21 DEVICE — FLOSEAL HEMOSTATIC MATRIX, 10ML
Type: IMPLANTABLE DEVICE | Site: VAGINA | Status: FUNCTIONAL
Brand: FLOSEAL HEMOSTATIC MATRIX

## 2023-03-21 DEVICE — STRATTICE RECONSTRUCTIVE TISSUE MATRIX, 06 X 10, FIRM
Type: IMPLANTABLE DEVICE | Site: VAGINA | Status: FUNCTIONAL
Brand: STRATTICE RECONSTRUCTIVE TISSUE MATRIX

## 2023-03-21 RX ORDER — PROPOFOL 10 MG/ML
VIAL (ML) INTRAVENOUS AS NEEDED
Status: DISCONTINUED | OUTPATIENT
Start: 2023-03-21 | End: 2023-03-21 | Stop reason: SURG

## 2023-03-21 RX ORDER — ESTRADIOL 0.1 MG/G
CREAM VAGINAL AS NEEDED
Status: DISCONTINUED | OUTPATIENT
Start: 2023-03-21 | End: 2023-03-21 | Stop reason: HOSPADM

## 2023-03-21 RX ORDER — NALOXONE HCL 0.4 MG/ML
0.4 VIAL (ML) INJECTION
Status: DISCONTINUED | OUTPATIENT
Start: 2023-03-21 | End: 2023-03-22 | Stop reason: HOSPADM

## 2023-03-21 RX ORDER — DROPERIDOL 2.5 MG/ML
0.62 INJECTION, SOLUTION INTRAMUSCULAR; INTRAVENOUS
Status: DISCONTINUED | OUTPATIENT
Start: 2023-03-21 | End: 2023-03-21 | Stop reason: HOSPADM

## 2023-03-21 RX ORDER — PANTOPRAZOLE SODIUM 40 MG/1
40 TABLET, DELAYED RELEASE ORAL DAILY
Status: DISCONTINUED | OUTPATIENT
Start: 2023-03-21 | End: 2023-03-22 | Stop reason: HOSPADM

## 2023-03-21 RX ORDER — PROMETHAZINE HYDROCHLORIDE 12.5 MG/1
12.5 SUPPOSITORY RECTAL EVERY 6 HOURS PRN
Status: DISCONTINUED | OUTPATIENT
Start: 2023-03-21 | End: 2023-03-22 | Stop reason: HOSPADM

## 2023-03-21 RX ORDER — FAMOTIDINE 10 MG/ML
20 INJECTION, SOLUTION INTRAVENOUS ONCE
Status: COMPLETED | OUTPATIENT
Start: 2023-03-21 | End: 2023-03-21

## 2023-03-21 RX ORDER — HYDROMORPHONE HYDROCHLORIDE 1 MG/ML
0.5 INJECTION, SOLUTION INTRAMUSCULAR; INTRAVENOUS; SUBCUTANEOUS
Status: DISCONTINUED | OUTPATIENT
Start: 2023-03-21 | End: 2023-03-21 | Stop reason: HOSPADM

## 2023-03-21 RX ORDER — PHENAZOPYRIDINE HYDROCHLORIDE 200 MG/1
200 TABLET, FILM COATED ORAL ONCE
Status: COMPLETED | OUTPATIENT
Start: 2023-03-21 | End: 2023-03-21

## 2023-03-21 RX ORDER — PROMETHAZINE HYDROCHLORIDE 25 MG/1
25 TABLET ORAL ONCE AS NEEDED
Status: DISCONTINUED | OUTPATIENT
Start: 2023-03-21 | End: 2023-03-21 | Stop reason: HOSPADM

## 2023-03-21 RX ORDER — ICOSAPENT ETHYL 1000 MG/1
2 CAPSULE ORAL DAILY
Status: DISCONTINUED | OUTPATIENT
Start: 2023-03-21 | End: 2023-03-22 | Stop reason: HOSPADM

## 2023-03-21 RX ORDER — LABETALOL HYDROCHLORIDE 5 MG/ML
5 INJECTION, SOLUTION INTRAVENOUS
Status: DISCONTINUED | OUTPATIENT
Start: 2023-03-21 | End: 2023-03-21 | Stop reason: HOSPADM

## 2023-03-21 RX ORDER — ACETAMINOPHEN 650 MG/1
650 SUPPOSITORY RECTAL ONCE AS NEEDED
Status: DISCONTINUED | OUTPATIENT
Start: 2023-03-21 | End: 2023-03-21 | Stop reason: HOSPADM

## 2023-03-21 RX ORDER — IBUPROFEN 400 MG/1
400 TABLET ORAL
Status: DISCONTINUED | OUTPATIENT
Start: 2023-03-21 | End: 2023-03-22 | Stop reason: HOSPADM

## 2023-03-21 RX ORDER — ATORVASTATIN CALCIUM 20 MG/1
80 TABLET, FILM COATED ORAL NIGHTLY
Status: DISCONTINUED | OUTPATIENT
Start: 2023-03-21 | End: 2023-03-22 | Stop reason: HOSPADM

## 2023-03-21 RX ORDER — ENOXAPARIN SODIUM 100 MG/ML
40 INJECTION SUBCUTANEOUS DAILY
Status: DISCONTINUED | OUTPATIENT
Start: 2023-03-22 | End: 2023-03-22 | Stop reason: HOSPADM

## 2023-03-21 RX ORDER — FERROUS SULFATE 325(65) MG
325 TABLET ORAL
Status: DISCONTINUED | OUTPATIENT
Start: 2023-03-22 | End: 2023-03-22 | Stop reason: HOSPADM

## 2023-03-21 RX ORDER — NALOXONE HCL 0.4 MG/ML
0.2 VIAL (ML) INJECTION AS NEEDED
Status: DISCONTINUED | OUTPATIENT
Start: 2023-03-21 | End: 2023-03-21 | Stop reason: HOSPADM

## 2023-03-21 RX ORDER — ROPINIROLE 2 MG/1
2 TABLET, FILM COATED ORAL NIGHTLY
Status: DISCONTINUED | OUTPATIENT
Start: 2023-03-21 | End: 2023-03-22 | Stop reason: HOSPADM

## 2023-03-21 RX ORDER — ROCURONIUM BROMIDE 10 MG/ML
INJECTION, SOLUTION INTRAVENOUS AS NEEDED
Status: DISCONTINUED | OUTPATIENT
Start: 2023-03-21 | End: 2023-03-21 | Stop reason: SURG

## 2023-03-21 RX ORDER — LEVOTHYROXINE SODIUM 88 UG/1
88 TABLET ORAL NIGHTLY
Status: DISCONTINUED | OUTPATIENT
Start: 2023-03-21 | End: 2023-03-22 | Stop reason: HOSPADM

## 2023-03-21 RX ORDER — SODIUM CHLORIDE 0.9 % (FLUSH) 0.9 %
3 SYRINGE (ML) INJECTION EVERY 12 HOURS SCHEDULED
Status: DISCONTINUED | OUTPATIENT
Start: 2023-03-21 | End: 2023-03-21 | Stop reason: HOSPADM

## 2023-03-21 RX ORDER — PROMETHAZINE HYDROCHLORIDE 25 MG/1
25 SUPPOSITORY RECTAL ONCE AS NEEDED
Status: DISCONTINUED | OUTPATIENT
Start: 2023-03-21 | End: 2023-03-21 | Stop reason: HOSPADM

## 2023-03-21 RX ORDER — ONDANSETRON 2 MG/ML
INJECTION INTRAMUSCULAR; INTRAVENOUS AS NEEDED
Status: DISCONTINUED | OUTPATIENT
Start: 2023-03-21 | End: 2023-03-21 | Stop reason: SURG

## 2023-03-21 RX ORDER — EPHEDRINE SULFATE 50 MG/ML
5 INJECTION, SOLUTION INTRAVENOUS ONCE AS NEEDED
Status: DISCONTINUED | OUTPATIENT
Start: 2023-03-21 | End: 2023-03-21 | Stop reason: HOSPADM

## 2023-03-21 RX ORDER — SODIUM CHLORIDE 9 MG/ML
40 INJECTION, SOLUTION INTRAVENOUS AS NEEDED
Status: DISCONTINUED | OUTPATIENT
Start: 2023-03-21 | End: 2023-03-21 | Stop reason: HOSPADM

## 2023-03-21 RX ORDER — FENTANYL CITRATE 50 UG/ML
INJECTION, SOLUTION INTRAMUSCULAR; INTRAVENOUS AS NEEDED
Status: DISCONTINUED | OUTPATIENT
Start: 2023-03-21 | End: 2023-03-21 | Stop reason: SURG

## 2023-03-21 RX ORDER — SODIUM CHLORIDE 0.9 % (FLUSH) 0.9 %
10 SYRINGE (ML) INJECTION AS NEEDED
Status: DISCONTINUED | OUTPATIENT
Start: 2023-03-21 | End: 2023-03-21 | Stop reason: HOSPADM

## 2023-03-21 RX ORDER — ONDANSETRON 2 MG/ML
4 INJECTION INTRAMUSCULAR; INTRAVENOUS EVERY 6 HOURS PRN
Status: DISCONTINUED | OUTPATIENT
Start: 2023-03-21 | End: 2023-03-22 | Stop reason: HOSPADM

## 2023-03-21 RX ORDER — OXYCODONE HYDROCHLORIDE AND ACETAMINOPHEN 5; 325 MG/1; MG/1
1 TABLET ORAL EVERY 4 HOURS PRN
Status: DISCONTINUED | OUTPATIENT
Start: 2023-03-21 | End: 2023-03-22 | Stop reason: HOSPADM

## 2023-03-21 RX ORDER — BUPIVACAINE HYDROCHLORIDE AND EPINEPHRINE 2.5; 5 MG/ML; UG/ML
INJECTION, SOLUTION EPIDURAL; INFILTRATION; INTRACAUDAL; PERINEURAL AS NEEDED
Status: DISCONTINUED | OUTPATIENT
Start: 2023-03-21 | End: 2023-03-21 | Stop reason: HOSPADM

## 2023-03-21 RX ORDER — SODIUM CHLORIDE 0.9 % (FLUSH) 0.9 %
10 SYRINGE (ML) INJECTION EVERY 12 HOURS SCHEDULED
Status: DISCONTINUED | OUTPATIENT
Start: 2023-03-21 | End: 2023-03-21 | Stop reason: HOSPADM

## 2023-03-21 RX ORDER — CEFAZOLIN SODIUM IN 0.9 % NACL 3 G/100 ML
3 INTRAVENOUS SOLUTION, PIGGYBACK (ML) INTRAVENOUS EVERY 8 HOURS
Status: COMPLETED | OUTPATIENT
Start: 2023-03-21 | End: 2023-03-21

## 2023-03-21 RX ORDER — MORPHINE SULFATE 2 MG/ML
1 INJECTION, SOLUTION INTRAMUSCULAR; INTRAVENOUS
Status: DISCONTINUED | OUTPATIENT
Start: 2023-03-21 | End: 2023-03-22 | Stop reason: HOSPADM

## 2023-03-21 RX ORDER — KETAMINE HCL IN NACL, ISO-OSM 100MG/10ML
SYRINGE (ML) INJECTION AS NEEDED
Status: DISCONTINUED | OUTPATIENT
Start: 2023-03-21 | End: 2023-03-21 | Stop reason: SURG

## 2023-03-21 RX ORDER — ONDANSETRON 4 MG/1
4 TABLET, FILM COATED ORAL EVERY 6 HOURS PRN
Status: DISCONTINUED | OUTPATIENT
Start: 2023-03-21 | End: 2023-03-22 | Stop reason: HOSPADM

## 2023-03-21 RX ORDER — LIDOCAINE HYDROCHLORIDE 10 MG/ML
0.5 INJECTION, SOLUTION EPIDURAL; INFILTRATION; INTRACAUDAL; PERINEURAL ONCE AS NEEDED
Status: DISCONTINUED | OUTPATIENT
Start: 2023-03-21 | End: 2023-03-21 | Stop reason: HOSPADM

## 2023-03-21 RX ORDER — CEFAZOLIN SODIUM 500 MG/2.2ML
INJECTION, POWDER, FOR SOLUTION INTRAMUSCULAR; INTRAVENOUS AS NEEDED
Status: DISCONTINUED | OUTPATIENT
Start: 2023-03-21 | End: 2023-03-21 | Stop reason: SURG

## 2023-03-21 RX ORDER — CEFAZOLIN SODIUM 2 G/100ML
2 INJECTION, SOLUTION INTRAVENOUS ONCE
Status: COMPLETED | OUTPATIENT
Start: 2023-03-21 | End: 2023-03-21

## 2023-03-21 RX ORDER — ONDANSETRON 2 MG/ML
4 INJECTION INTRAMUSCULAR; INTRAVENOUS ONCE AS NEEDED
Status: DISCONTINUED | OUTPATIENT
Start: 2023-03-21 | End: 2023-03-21 | Stop reason: HOSPADM

## 2023-03-21 RX ORDER — SCOLOPAMINE TRANSDERMAL SYSTEM 1 MG/1
1 PATCH, EXTENDED RELEASE TRANSDERMAL ONCE
Status: COMPLETED | OUTPATIENT
Start: 2023-03-21 | End: 2023-03-22

## 2023-03-21 RX ORDER — DOCUSATE SODIUM 100 MG/1
100 CAPSULE, LIQUID FILLED ORAL 2 TIMES DAILY
Status: DISCONTINUED | OUTPATIENT
Start: 2023-03-21 | End: 2023-03-22 | Stop reason: HOSPADM

## 2023-03-21 RX ORDER — SODIUM CHLORIDE 0.9 % (FLUSH) 0.9 %
3-10 SYRINGE (ML) INJECTION AS NEEDED
Status: DISCONTINUED | OUTPATIENT
Start: 2023-03-21 | End: 2023-03-21 | Stop reason: HOSPADM

## 2023-03-21 RX ORDER — DEXTROSE AND SODIUM CHLORIDE 5; .45 G/100ML; G/100ML
100 INJECTION, SOLUTION INTRAVENOUS CONTINUOUS
Status: DISCONTINUED | OUTPATIENT
Start: 2023-03-21 | End: 2023-03-22 | Stop reason: HOSPADM

## 2023-03-21 RX ORDER — HYDROCODONE BITARTRATE AND ACETAMINOPHEN 5; 325 MG/1; MG/1
1 TABLET ORAL ONCE AS NEEDED
Status: DISCONTINUED | OUTPATIENT
Start: 2023-03-21 | End: 2023-03-21 | Stop reason: HOSPADM

## 2023-03-21 RX ORDER — FENTANYL CITRATE 50 UG/ML
50 INJECTION, SOLUTION INTRAMUSCULAR; INTRAVENOUS
Status: DISCONTINUED | OUTPATIENT
Start: 2023-03-21 | End: 2023-03-21 | Stop reason: HOSPADM

## 2023-03-21 RX ORDER — MORPHINE SULFATE 2 MG/ML
2 INJECTION, SOLUTION INTRAMUSCULAR; INTRAVENOUS
Status: DISCONTINUED | OUTPATIENT
Start: 2023-03-21 | End: 2023-03-22 | Stop reason: HOSPADM

## 2023-03-21 RX ORDER — HYDRALAZINE HYDROCHLORIDE 20 MG/ML
5 INJECTION INTRAMUSCULAR; INTRAVENOUS
Status: DISCONTINUED | OUTPATIENT
Start: 2023-03-21 | End: 2023-03-21 | Stop reason: HOSPADM

## 2023-03-21 RX ORDER — SODIUM CHLORIDE, SODIUM LACTATE, POTASSIUM CHLORIDE, CALCIUM CHLORIDE 600; 310; 30; 20 MG/100ML; MG/100ML; MG/100ML; MG/100ML
9 INJECTION, SOLUTION INTRAVENOUS CONTINUOUS
Status: DISCONTINUED | OUTPATIENT
Start: 2023-03-21 | End: 2023-03-22 | Stop reason: HOSPADM

## 2023-03-21 RX ORDER — MIDAZOLAM HYDROCHLORIDE 1 MG/ML
1 INJECTION INTRAMUSCULAR; INTRAVENOUS
Status: DISCONTINUED | OUTPATIENT
Start: 2023-03-21 | End: 2023-03-21 | Stop reason: HOSPADM

## 2023-03-21 RX ORDER — LIDOCAINE HYDROCHLORIDE 20 MG/ML
INJECTION, SOLUTION INFILTRATION; PERINEURAL AS NEEDED
Status: DISCONTINUED | OUTPATIENT
Start: 2023-03-21 | End: 2023-03-21 | Stop reason: SURG

## 2023-03-21 RX ORDER — PROMETHAZINE HYDROCHLORIDE 12.5 MG/1
12.5 TABLET ORAL EVERY 6 HOURS PRN
Status: DISCONTINUED | OUTPATIENT
Start: 2023-03-21 | End: 2023-03-22 | Stop reason: HOSPADM

## 2023-03-21 RX ORDER — ACETAMINOPHEN 325 MG/1
650 TABLET ORAL ONCE AS NEEDED
Status: DISCONTINUED | OUTPATIENT
Start: 2023-03-21 | End: 2023-03-21 | Stop reason: HOSPADM

## 2023-03-21 RX ORDER — FLUMAZENIL 0.1 MG/ML
0.2 INJECTION INTRAVENOUS AS NEEDED
Status: DISCONTINUED | OUTPATIENT
Start: 2023-03-21 | End: 2023-03-21 | Stop reason: HOSPADM

## 2023-03-21 RX ORDER — EPHEDRINE SULFATE 50 MG/ML
INJECTION INTRAVENOUS AS NEEDED
Status: DISCONTINUED | OUTPATIENT
Start: 2023-03-21 | End: 2023-03-21 | Stop reason: SURG

## 2023-03-21 RX ORDER — FLUOXETINE HYDROCHLORIDE 20 MG/1
40 CAPSULE ORAL NIGHTLY
Status: DISCONTINUED | OUTPATIENT
Start: 2023-03-21 | End: 2023-03-22 | Stop reason: HOSPADM

## 2023-03-21 RX ORDER — DEXAMETHASONE SODIUM PHOSPHATE 4 MG/ML
INJECTION, SOLUTION INTRA-ARTICULAR; INTRALESIONAL; INTRAMUSCULAR; INTRAVENOUS; SOFT TISSUE AS NEEDED
Status: DISCONTINUED | OUTPATIENT
Start: 2023-03-21 | End: 2023-03-21 | Stop reason: SURG

## 2023-03-21 RX ORDER — OXYCODONE AND ACETAMINOPHEN 10; 325 MG/1; MG/1
1 TABLET ORAL EVERY 4 HOURS PRN
Status: DISCONTINUED | OUTPATIENT
Start: 2023-03-21 | End: 2023-03-22 | Stop reason: HOSPADM

## 2023-03-21 RX ORDER — MAGNESIUM HYDROXIDE 1200 MG/15ML
LIQUID ORAL AS NEEDED
Status: DISCONTINUED | OUTPATIENT
Start: 2023-03-21 | End: 2023-03-21 | Stop reason: HOSPADM

## 2023-03-21 RX ADMIN — EPHEDRINE SULFATE 5 MG: 50 INJECTION INTRAVENOUS at 11:26

## 2023-03-21 RX ADMIN — FENTANYL CITRATE 25 MCG: 50 INJECTION, SOLUTION INTRAMUSCULAR; INTRAVENOUS at 07:45

## 2023-03-21 RX ADMIN — EPHEDRINE SULFATE 5 MG: 50 INJECTION INTRAVENOUS at 11:02

## 2023-03-21 RX ADMIN — ROCURONIUM BROMIDE 10 MG: 10 INJECTION, SOLUTION INTRAVENOUS at 12:08

## 2023-03-21 RX ADMIN — LEVOTHYROXINE SODIUM 88 MCG: 0.09 TABLET ORAL at 20:36

## 2023-03-21 RX ADMIN — DEXTROSE AND SODIUM CHLORIDE 100 ML/HR: 5; 450 INJECTION, SOLUTION INTRAVENOUS at 16:10

## 2023-03-21 RX ADMIN — LIDOCAINE HYDROCHLORIDE 100 MG: 20 INJECTION, SOLUTION INFILTRATION; PERINEURAL at 07:41

## 2023-03-21 RX ADMIN — PROPOFOL 150 MG: 10 INJECTION, EMULSION INTRAVENOUS at 07:41

## 2023-03-21 RX ADMIN — ATORVASTATIN CALCIUM 80 MG: 20 TABLET, FILM COATED ORAL at 20:35

## 2023-03-21 RX ADMIN — MORPHINE SULFATE 1 MG: 2 INJECTION, SOLUTION INTRAMUSCULAR; INTRAVENOUS at 14:15

## 2023-03-21 RX ADMIN — ROPINIROLE 2 MG: 2 TABLET, FILM COATED ORAL at 20:35

## 2023-03-21 RX ADMIN — HYDROMORPHONE HYDROCHLORIDE 0.25 MG: 1 INJECTION, SOLUTION INTRAMUSCULAR; INTRAVENOUS; SUBCUTANEOUS at 10:56

## 2023-03-21 RX ADMIN — ROCURONIUM BROMIDE 15 MG: 10 INJECTION, SOLUTION INTRAVENOUS at 11:26

## 2023-03-21 RX ADMIN — EPHEDRINE SULFATE 5 MG: 50 INJECTION INTRAVENOUS at 09:28

## 2023-03-21 RX ADMIN — EPHEDRINE SULFATE 10 MG: 50 INJECTION INTRAVENOUS at 08:43

## 2023-03-21 RX ADMIN — PANTOPRAZOLE SODIUM 40 MG: 40 TABLET, DELAYED RELEASE ORAL at 20:38

## 2023-03-21 RX ADMIN — FAMOTIDINE 20 MG: 10 INJECTION INTRAVENOUS at 06:35

## 2023-03-21 RX ADMIN — SCOPALAMINE 1 PATCH: 1 PATCH, EXTENDED RELEASE TRANSDERMAL at 06:34

## 2023-03-21 RX ADMIN — ROCURONIUM BROMIDE 20 MG: 10 INJECTION, SOLUTION INTRAVENOUS at 10:03

## 2023-03-21 RX ADMIN — EPHEDRINE SULFATE 10 MG: 50 INJECTION INTRAVENOUS at 10:17

## 2023-03-21 RX ADMIN — DEXAMETHASONE SODIUM PHOSPHATE 10 MG: 4 INJECTION, SOLUTION INTRAMUSCULAR; INTRAVENOUS at 07:50

## 2023-03-21 RX ADMIN — SODIUM CHLORIDE, POTASSIUM CHLORIDE, SODIUM LACTATE AND CALCIUM CHLORIDE: 600; 310; 30; 20 INJECTION, SOLUTION INTRAVENOUS at 09:50

## 2023-03-21 RX ADMIN — CEFAZOLIN SODIUM 2 G: 2 INJECTION, SOLUTION INTRAVENOUS at 07:23

## 2023-03-21 RX ADMIN — SODIUM CHLORIDE, POTASSIUM CHLORIDE, SODIUM LACTATE AND CALCIUM CHLORIDE 9 ML/HR: 600; 310; 30; 20 INJECTION, SOLUTION INTRAVENOUS at 06:35

## 2023-03-21 RX ADMIN — IBUPROFEN 400 MG: 400 TABLET, FILM COATED ORAL at 16:10

## 2023-03-21 RX ADMIN — Medication 10 MG: at 07:41

## 2023-03-21 RX ADMIN — SODIUM CHLORIDE, POTASSIUM CHLORIDE, SODIUM LACTATE AND CALCIUM CHLORIDE: 600; 310; 30; 20 INJECTION, SOLUTION INTRAVENOUS at 11:44

## 2023-03-21 RX ADMIN — SUGAMMADEX 200 MG: 100 INJECTION, SOLUTION INTRAVENOUS at 12:41

## 2023-03-21 RX ADMIN — Medication 10 MG: at 09:50

## 2023-03-21 RX ADMIN — HYDROMORPHONE HYDROCHLORIDE 0.25 MG: 1 INJECTION, SOLUTION INTRAMUSCULAR; INTRAVENOUS; SUBCUTANEOUS at 12:45

## 2023-03-21 RX ADMIN — CEFAZOLIN 2 G: 225 INJECTION, POWDER, FOR SOLUTION INTRAMUSCULAR; INTRAVENOUS at 11:28

## 2023-03-21 RX ADMIN — ROCURONIUM BROMIDE 5 MG: 10 INJECTION, SOLUTION INTRAVENOUS at 10:51

## 2023-03-21 RX ADMIN — CEFAZOLIN SODIUM 3 G: 10 INJECTION, POWDER, FOR SOLUTION INTRAVENOUS at 17:07

## 2023-03-21 RX ADMIN — DOCUSATE SODIUM 100 MG: 100 CAPSULE, LIQUID FILLED ORAL at 20:35

## 2023-03-21 RX ADMIN — FLUOXETINE HYDROCHLORIDE 40 MG: 20 CAPSULE ORAL at 20:35

## 2023-03-21 RX ADMIN — PROPOFOL 25 MCG/KG/MIN: 10 INJECTION, EMULSION INTRAVENOUS at 07:50

## 2023-03-21 RX ADMIN — Medication 20 MG: at 07:50

## 2023-03-21 RX ADMIN — PHENAZOPYRIDINE 200 MG: 200 TABLET ORAL at 06:07

## 2023-03-21 RX ADMIN — CEFAZOLIN SODIUM 3 G: 10 INJECTION, POWDER, FOR SOLUTION INTRAVENOUS at 22:12

## 2023-03-21 RX ADMIN — EPHEDRINE SULFATE 10 MG: 50 INJECTION INTRAVENOUS at 10:42

## 2023-03-21 RX ADMIN — EPHEDRINE SULFATE 5 MG: 50 INJECTION INTRAVENOUS at 08:19

## 2023-03-21 RX ADMIN — IBUPROFEN 400 MG: 400 TABLET, FILM COATED ORAL at 20:35

## 2023-03-21 RX ADMIN — HYDROMORPHONE HYDROCHLORIDE 0.5 MG: 1 INJECTION, SOLUTION INTRAMUSCULAR; INTRAVENOUS; SUBCUTANEOUS at 10:03

## 2023-03-21 RX ADMIN — OXYCODONE HYDROCHLORIDE AND ACETAMINOPHEN 1 TABLET: 5; 325 TABLET ORAL at 16:14

## 2023-03-21 RX ADMIN — Medication 10 MG: at 08:50

## 2023-03-21 RX ADMIN — ROCURONIUM BROMIDE 50 MG: 10 INJECTION, SOLUTION INTRAVENOUS at 07:41

## 2023-03-21 RX ADMIN — FENTANYL CITRATE 25 MCG: 50 INJECTION, SOLUTION INTRAMUSCULAR; INTRAVENOUS at 07:55

## 2023-03-21 RX ADMIN — ONDANSETRON 4 MG: 2 INJECTION INTRAMUSCULAR; INTRAVENOUS at 10:30

## 2023-03-21 NOTE — ANESTHESIA POSTPROCEDURE EVALUATION
Patient: Bethanie Moody    Procedure Summary     Date: 03/21/23 Room / Location: [facility] [address] [place] [address] MAIN OR    Anesthesia Start: 0730 Anesthesia Stop: 1301    Procedure: Posterior colporrhaphy Enterocele repair Pubovaginal sling Extraperitoneal vaginal colpopexy sacrospinous ligament fixation Insertion of vaginal grafts cystourethroscopy with bladder instillation (Vagina) Diagnosis:       Prolapse of vaginal vault after hysterectomy      Cystocele, midline      Vaginal enterocele      Rectocele      Loss of perineal body, female      Female stress incontinence      Slow transit constipation      Fecal incontinence      Incomplete defecation      (Prolapse of vaginal vault after hysterectomy [N99.3])      (Cystocele, midline [N81.11])      (Vaginal enterocele [N81.5])      (Rectocele [N81.6])      (Loss of perineal body, female [N81.89])      (Female stress incontinence [N39.3])      (Slow transit constipation [K59.01])      (Fecal incontinence [R15.9])      (Incomplete defecation [R15.0])    Surgeons: Leila Martins MD Provider: Hany Magaña MD    Anesthesia Type: general ASA Status: 3          Anesthesia Type: general    Vitals  Vitals Value Taken Time   /65 03/21/23 1331   Temp 37 °C (98.6 °F) 03/21/23 1258   Pulse 89 03/21/23 1338   Resp 16 03/21/23 1330   SpO2 97 % 03/21/23 1338   Vitals shown include unvalidated device data.        Post Anesthesia Care and Evaluation    Patient location during evaluation: PACU  Patient participation: complete - patient participated  Level of consciousness: awake and alert  Pain management: adequate    Airway patency: patent  Anesthetic complications: No anesthetic complications    Cardiovascular status: acceptable  Respiratory status: acceptable  Hydration status: acceptable    Comments: --------------------            03/21/23               1330     --------------------   BP:       116/65     Pulse:      91       Resp:       16        Temp:                SpO2:      92%      --------------------

## 2023-03-21 NOTE — H&P
Female Pelvic Medicine and Reconstructive Surgery   History & Physical    Patient Identification:  Name: Bethanie Moody Age: 62 y.o. Sex: female :  1960 MRN: Female Pelvic Medicine and Reconstructive Surgery   History & Physical    Patient Identification:  Name: Bethanie Moody Age: 62 y.o. Sex: female :  1960 MRN: [id number]                            Problem List:    Rectocele    Prolapse of vaginal vault after hysterectomy    Cystocele, midline    Vaginal enterocele    Loss of perineal body, female    Female stress incontinence    Slow transit constipation    Fecal incontinence    Incomplete defecation    Past Medical History:  Past Medical History:   Diagnosis Date   • Anemia 2010   • Cancer (HCC)     Breast, right   • Colon polyp    • Depression    • Diabetes mellitus (HCC) 2018    PRE   • Elevated cholesterol    • Fatty liver 2019   • Gastroparesis    • GERD (gastroesophageal reflux disease)    • History of uterine cancer    • HX: breast cancer    • Hyperlipidemia    • Internal hemorrhoid    • PONV (postoperative nausea and vomiting)    • RLS (restless legs syndrome)    • Sleep apnea     W[facility] Cpap at [facility]   • Thyroid disease    • Urinary urgency      Past Surgical History:  Past Surgical History:   Procedure Laterality Date   • BREAST LUMPECTOMY Right    • BREAST RECONSTRUCTION     • CHOLECYSTECTOMY     • COLONOSCOPY      Per pt. IH, diverticulitis, otherwise normal (dr. Matias)   • COLONOSCOPY N/A 2016    polyps, tics, NBIH, hyperplastic polyp x 2   • HYSTERECTOMY  3/29/21   • UPPER GASTROINTESTINAL ENDOSCOPY  2017    multiple fundic gland polyps      Home Meds:  Medications Prior to Admission   Medication Sig Dispense Refill Last Dose   • atorvastatin (LIPITOR) 80 MG tablet Take 1 tablet by mouth Every Night.   3/20/2023 at 2100   • Brexpiprazole (Rexulti) 1 MG tablet Take 1 mg by mouth Every Night.   3/20/2023 at 2100   • Chlorhexidine Gluconate Cloth 2 % pads  Apply  topically Take As Directed.   3/21/2023 at 0600   • ferrous sulfate 325 (65 FE) MG tablet Take 1 tablet by mouth Daily With Breakfast.   Past Week   • FLUoxetine (PROzac) 40 MG capsule Take 1 capsule by mouth Every Night.   3/20/2023 at 2100   • icosapent ethyl (VASCEPA) 1 g capsule capsule Take 2 g by mouth Daily.   Past Week   • levothyroxine (SYNTHROID, LEVOTHROID) 88 MCG tablet Take 1 tablet by mouth Every Night.   3/20/2023 at 2100   • metFORMIN (GLUCOPHAGE) 500 MG tablet Take 1 tablet by mouth Daily With Breakfast.   3/20/2023 at 800   • pantoprazole (PROTONIX) 40 MG EC tablet Take 1 tablet by mouth Daily. (Patient taking differently: Take 1 tablet by mouth Every Night.) 90 tablet 1 3/20/2023 at 2100   • rOPINIRole (REQUIP) 2 MG tablet Take 1 tablet by mouth Every Night.   3/20/2023 at 2100   • Semaglutide (OZEMPIC, 0.25 OR 0.5 MG/DOSE, SC) Inject 0.5 mg under the skin into the appropriate area as directed 1 (One) Time Per Week. takes on sunday   Past Week   • linaclotide (Linzess) 72 MCG capsule capsule Take 1 capsule by mouth Every Morning Before Breakfast. (Patient taking differently: Take 1 capsule by mouth As Needed.) 90 capsule 1      Current Meds:     Current Facility-Administered Medications:   •  ceFAZolin in dextrose (ANCEF) IVPB solution 2 g, 2 g, Intravenous, Once, Leila Martins MD  •  fentaNYL citrate (PF) (SUBLIMAZE) injection 50 mcg, 50 mcg, Intravenous, Q10 Min PRN, Hany Magaña MD  •  lactated ringers infusion, 9 mL/hr, Intravenous, Continuous, Hany Magaña MD, Last Rate: 9 mL/hr at 03/21/23 0706, Currently Infusing at 03/21/23 0706  •  lidocaine PF 1% (XYLOCAINE) injection 0.5 mL, 0.5 mL, Injection, Once PRN, Hany Magaña MD  •  midazolam (VERSED) injection 1 mg, 1 mg, Intravenous, Q10 Min PRN, Hany Magaña MD  •  scopolamine patch 1 mg/72 hr, 1 patch, Transdermal, Once, Hany Magaña MD, 1 patch at 03/21/23 0634  •  sodium  chloride 0.9 % flush 10 mL, 10 mL, Intravenous, Q12H, Leila Martins MD  •  sodium chloride 0.9 % flush 10 mL, 10 mL, Intravenous, PRN, Leila Martins MD  •  sodium chloride 0.9 % flush 3 mL, 3 mL, Intravenous, Q12H, Hany Magaña MD  •  sodium chloride 0.9 % flush 3-10 mL, 3-10 mL, Intravenous, PRN, Hany Magaña MD  •  sodium chloride 0.9 % infusion 40 mL, 40 mL, Intravenous, PRN, Leila Martins MD  Allergies:  No Known Allergies  Immunizations:  There is no immunization history for the selected administration types on file for this patient.  Social History:   Social History     Tobacco Use   • Smoking status: Never   • Smokeless tobacco: Never   Substance Use Topics   • Alcohol use: Not Currently     Alcohol/week: 1.0 standard drink     Types: 1 Cans of beer per week     Comment: occasssionally      Family History:  Family History   Problem Relation Age of Onset   • Depression Mother    • Alcohol abuse Father    • Colon polyps Sister    • Arthritis Sister    • Depression Sister    • Hyperlipidemia Sister    • Colon polyps Sister    • Depression Sister    • Hyperlipidemia Sister    • Colon polyps Sister    • Depression Sister    • Hyperlipidemia Sister    • Colon polyps Sister    • Depression Sister    • Hyperlipidemia Sister    • Colon polyps Sister    • Colon polyps Sister    • Colon polyps Sister    • Colon polyps Sister    • Arthritis Brother    • Asthma Son    • Malig Hyperthermia Neg Hx         Review of Systems  Constitutional:  Denies fever or chills   Eyes:  Denies change in visual acuity   HEENT:  Denies nasal congestion or sore throat   Respiratory:  Denies cough or shortness of breath   Cardiovascular:  Denies chest pain or edema   GI:  Denies abdominal pain, nausea, vomiting, bloody stools or diarrhea   :  Denies dysuria   Musculoskeletal:  Denies back pain or joint pain   Integument:  Denies rash   Neurologic:  Denies headache, focal weakness or sensory changes    Endocrine:  Denies polyuria or polydipsia   Lymphatic:  Denies swollen glands   Psychiatric:  Denies depression or anxiety       Objective:  tMax 24 hrs: Temp (24hrs), Av.9 °F (36.6 °C), Min:97.9 °F (36.6 °C), Max:97.9 °F (36.6 °C)    Vitals Ranges:   Temp:  [97.9 °F (36.6 °C)] 97.9 °F (36.6 °C)  Heart Rate:  [78] 78  Resp:  [16] 16  BP: (122)/(68) 122/68    Exam:  Vital Signs: /68 (BP Location: Left arm, Patient Position: Sitting)   Pulse 78   Temp 97.9 °F (36.6 °C) (Oral)   Resp 16   LMP 2010   SpO2 97%   Constitutional:  Well developed, well nourished, no acute distress, non-toxic appearance    GI:  Soft, nondistended, normal bowel sounds, nontender, no organomegaly, no mass, no rebound, no guarding   :  No costovertebral angle tenderness   Musculoskeletal:  No edema, no tenderness, no deformities. Back- no tenderness  Integument:  Well hydrated, no rash   Lymphatic:  No lymphadenopathy noted   Neurologic:  Alert & oriented x 3,  Pelvic:       Assessment:    Prolapse of vaginal vault after hysterectomy    Cystocele, midline    Vaginal enterocele    Rectocele    Loss of perineal body, female    Female stress incontinence    Slow transit constipation    Fecal incontinence    Incomplete defecation     Plan:  Anterior colporrhaphy   Posterior colporrhaphy   Enterocele repair   Pubovaginal sling   Intraperitoneal uterosacral ligament vaginal colpopexy   Extraperitoneal vaginal colpopexy sacrospinous ligament fixation   Insertion of vaginal grafts   Cystourethroscopy with bladder instillation    Leila Martins MD  3/21/2023

## 2023-03-21 NOTE — ANESTHESIA PROCEDURE NOTES
Airway  Urgency: elective    Date/Time: 3/21/2023 7:44 AM  Airway not difficult    General Information and Staff    Patient location during procedure: OR  Anesthesiologist: Hany Magaña MD  CRNA/CAA: Mariella Perea CRNA    Indications and Patient Condition  Indications for airway management: airway protection    Preoxygenated: yes (pt pre-O2 with 100% O2)  Mask difficulty assessment: 2 - vent by mask + OA or adjuvant +/- NMBA (easy BMV )    Final Airway Details  Final airway type: endotracheal airway      Successful airway: ETT  Cuffed: yes   Successful intubation technique: direct laryngoscopy  Facilitating devices/methods: anterior pressure/BURP  Endotracheal tube insertion site: oral  Blade: Selvin  Blade size: 4  ETT size (mm): 7.0  Cormack-Lehane Classification: grade IIa - partial view of glottis  Placement verified by: chest auscultation and capnometry   Cuff volume (mL): 7  Measured from: lips  ETT/EBT  to lips (cm): 22  Number of attempts at approach: 1  Assessment: lips, teeth, and gum same as pre-op and atraumatic intubation    Additional Comments  ATOETx1. No change in dentition.

## 2023-03-21 NOTE — PLAN OF CARE
Goal Outcome Evaluation:  Plan of Care Reviewed With: patient, spouse, family        Progress: no change  Outcome Evaluation: VSS, ambulated from stretcher to bed, 2 suprapubic sites w/ scant serosanguineous drainage, vag packing is intact with serosanguineous drainage, nini pad with scant amount of drainage. Pain controlled w/ scheduled meds and one dose of morphine and one dose of norco. No nauea reported, tolerated clear liquids and crackers so far. SCDs on, IS used, family at bedside. Odell catheter in place w/ adequate output.

## 2023-03-21 NOTE — ANESTHESIA PREPROCEDURE EVALUATION
Anesthesia Evaluation     Patient summary reviewed and Nursing notes reviewed   history of anesthetic complications: PONV               Airway   Mallampati: II  TM distance: >3 FB  Neck ROM: full  Dental      Pulmonary - negative pulmonary ROS   Cardiovascular     ECG reviewed  Rhythm: regular  Rate: normal    (+) hyperlipidemia,       Neuro/Psych  (+) psychiatric history Depression,    GI/Hepatic/Renal/Endo    (+)  GERD,  liver disease fatty liver disease, diabetes mellitus type 2, thyroid problem hypothyroidism    Musculoskeletal (-) negative ROS    Abdominal    Substance History - negative use     OB/GYN negative ob/gyn ROS         Other      history of cancer                    Anesthesia Plan    ASA 3     general   total IV anesthesia  (TIVA PSR  BMI  Thrombocytopenia    I have reviewed the patient's history with the patient and the chart, including all pertinent laboratory results and imaging. I have explained the risks of anesthesia including but not limited to dental damage, corneal abrasion, nerve injury, MI, stroke, and death. Questions asked and answered. Anesthetic plan discussed with patient and team as indicated. Patient expressed understanding of the above.  )  intravenous induction     Anesthetic plan, risks, benefits, and alternatives have been provided, discussed and informed consent has been obtained with: patient.        CODE STATUS:

## 2023-03-22 VITALS
RESPIRATION RATE: 16 BRPM | SYSTOLIC BLOOD PRESSURE: 110 MMHG | HEART RATE: 76 BPM | OXYGEN SATURATION: 96 % | TEMPERATURE: 98.3 F | DIASTOLIC BLOOD PRESSURE: 59 MMHG | WEIGHT: 224.87 LBS | HEIGHT: 70 IN | BODY MASS INDEX: 32.19 KG/M2

## 2023-03-22 LAB
HCT VFR BLD AUTO: 27.1 % (ref 34–46.6)
HGB BLD-MCNC: 9 G/DL (ref 12–15.9)

## 2023-03-22 PROCEDURE — 25010000002 ENOXAPARIN PER 10 MG: Performed by: OBSTETRICS & GYNECOLOGY

## 2023-03-22 PROCEDURE — 85018 HEMOGLOBIN: CPT | Performed by: OBSTETRICS & GYNECOLOGY

## 2023-03-22 PROCEDURE — 85014 HEMATOCRIT: CPT | Performed by: OBSTETRICS & GYNECOLOGY

## 2023-03-22 PROCEDURE — G0378 HOSPITAL OBSERVATION PER HR: HCPCS

## 2023-03-22 RX ORDER — OXYCODONE HYDROCHLORIDE AND ACETAMINOPHEN 5; 325 MG/1; MG/1
1 TABLET ORAL EVERY 4 HOURS PRN
Qty: 20 TABLET | Refills: 0 | Status: SHIPPED | OUTPATIENT
Start: 2023-03-22 | End: 2023-03-31

## 2023-03-22 RX ORDER — NALOXONE HYDROCHLORIDE 4 MG/.1ML
SPRAY NASAL
Qty: 2 EACH | Refills: 0 | Status: SHIPPED | OUTPATIENT
Start: 2023-03-22

## 2023-03-22 RX ADMIN — DOCUSATE SODIUM 100 MG: 100 CAPSULE, LIQUID FILLED ORAL at 08:40

## 2023-03-22 RX ADMIN — IBUPROFEN 400 MG: 400 TABLET, FILM COATED ORAL at 06:24

## 2023-03-22 RX ADMIN — FERROUS SULFATE TAB 325 MG (65 MG ELEMENTAL FE) 325 MG: 325 (65 FE) TAB at 08:40

## 2023-03-22 RX ADMIN — DEXTROSE AND SODIUM CHLORIDE 100 ML/HR: 5; 450 INJECTION, SOLUTION INTRAVENOUS at 03:01

## 2023-03-22 RX ADMIN — OXYCODONE HYDROCHLORIDE AND ACETAMINOPHEN 1 TABLET: 10; 325 TABLET ORAL at 03:04

## 2023-03-22 RX ADMIN — METFORMIN HYDROCHLORIDE 500 MG: 500 TABLET, FILM COATED ORAL at 08:41

## 2023-03-22 RX ADMIN — ENOXAPARIN SODIUM 40 MG: 100 INJECTION SUBCUTANEOUS at 08:41

## 2023-03-22 RX ADMIN — PANTOPRAZOLE SODIUM 40 MG: 40 TABLET, DELAYED RELEASE ORAL at 06:23

## 2023-03-22 RX ADMIN — IBUPROFEN 400 MG: 400 TABLET, FILM COATED ORAL at 16:06

## 2023-03-22 RX ADMIN — IBUPROFEN 400 MG: 400 TABLET, FILM COATED ORAL at 01:10

## 2023-03-22 RX ADMIN — IBUPROFEN 400 MG: 400 TABLET, FILM COATED ORAL at 11:47

## 2023-03-22 NOTE — PLAN OF CARE
Goal Outcome Evaluation:  Plan of Care Reviewed With: patient, spouse        Progress: improving  Outcome Evaluation: Patient passed voiding trial this AM, PVR 0mL. Voiding without difficulty remainder of shift. Puncture x 2 CD&I, scant vaginal bleeding. Saline locked. Tolerating regular diet. C/o minimal pain, only taking scheduled medication. Up with assist, family at bedside. Anticipate discharge home this evening when MD rounds.

## 2023-03-23 NOTE — PROGRESS NOTES
Case Management Discharge Note      Final Note: Discharged home. Leila Parkinson, YARY              Transportation Services  Private: Car    Final Discharge Disposition Code: 01 - home or self-care

## 2023-04-03 NOTE — OP NOTE
OPERATIVE/PROCEDURE REPORT     [facility] MAIN OR     PATIENT NAME:    Bethanie Moody         : 1960     DATE OF OPERATIONS:  3/21/2023     PREOPERATIVE DIAGNOSES:   1. Vaginal vault prolapse, N99.3  2. Cystocele, N81.11.  3. Rectocele, N81.6.   4. Enterocele, N81.5.   5. Loss of perineal body, N81.89  6. Stress urinary incontinence, N39.3  7. Fecal incontinence, R15.9  8. Incomplete defecation, R15.0  9. Slow transit constipation, K59.0  10 Incompetence of rectovaginal tissue, N81.83     POSTOPERATIVE DIAGNOSES:      SURGEON: Leila Martins M.D.      PROCEDURE(S) PERFORMED:   1.   Posterior colporrhaphy  2.   Enterocele repair, vaginal, combined 16330 (components 02231, 63003 and 06762)  3.   Pubovaginal sling, retropubic porcine, 34449  4.   Extraperitoneal colpopexy sacrospinous ligament fixation, 52918  5.   Insertion of vaginal graft, 90240 add on to 12525 component of 58652      6.   Cystourethroscopy with bladder instillation, 08899     FINDING(S): On cystourethroscopy, following all the procedures, there is bilateral efflux of Pyridium stained urine from both the right and the left ureteral orifices. There are no lesions or foreign material of the bladder or the urethra.     SPECIMEN(S): none     DESCRIPTION OF PROCEDURE(S): The patient was taken to the Operating Room with a peripheral IV in place. She underwent the induction of general anesthesia, was prepped and draped in the dorsal lithotomy position in [facility]. Cystourethroscopy showed no lesions or foreign material of the bladder or the urethra. There is bilateral efflux of Pyridium stained urine from both the right and the left ureteral orifices. The cystoscope was removed and a Odell was placed and set to straight drainage. A suburethral incision was made and dissected bilaterally.  A porcine graft sheet of [place] was made into a sling and sewn together with 0 Vicryl suture. The retropubic 1 centimeter porcine  sling was placed and adjusted without tension. Cystourethroscopy with each transvaginal introducer in place showed no lesions or foreign material of the bladder or the urethra. The pubovaginal sling was adjusted without tension so that a curved Soares easily fit between the sub-urethral tissue and the tape. The bladder instillation was placed.  A posterior vaginal incision was made and the rectum was dissected from the vagina. The enterocele sac was closed with 0 Vicryl  to accomplish the vaginal enterocele repair.  The sacrospinous ligaments were identified bilaterally and 0 permanent suture on a Capio Slim was placed in the right sacrospinous ligament and into the left sacrospinous ligament. These sutures were attached to a T-shaped porcine graft and tied down to accomplish the extraperitoneal colpopexy sacrospinous ligament fixation. The inferior portion of the posterior vaginal graft was tied down with 0 Vicryl.  Zero Vicryl interrupted sutures were used plicate the rectovaginal fascia in the midline to accomplish the posterior colporrhaphy and to reconstruct the perineal body.  The posterior vaginal epithelium was closed with 2-0 Vicryl suture. Cystourethroscopy showed no lesions or foreign material of the bladder or the urethra, and there was bilateral efflux of Pyridium stained urine from both the right and the left ureteral orifices. The pubovaginal sling tape was adjusted without tension, the suprapubic excess porcine graft was trimmed, and the skin lifted away. These suprapubic incisions were closed with 4-0 Vicryl. The suburethral incision was closed with 2-0 Vicryl and was hemostatic. Cystourethroscopy showed no lesions or foreign material of the bladder or the urethra, and there was bilateral efflux of Pyridium stained urine from both the right and the left ureteral orifices. The cystoscope was removed and a Odell was placed and set to straight drainage.  A vaginal pack was placed into the vagina. The  patient was taken out of the dorsal lithotomy position, awakened from general anesthesia, and taken to the Recovery Room in good condition.  All final needle, sponge, and instrument counts were reported as correct. There were no complications to the procedures.           URINE OUTPUT: 200 mL    ESTIMATED BLOOD LOSS: 300 mL    FLUIDS: 2300 mL              MARÍA CASTILLO MD, MSc, FACOG, FACS

## 2023-04-17 NOTE — DISCHARGE SUMMARY
Physician Discharge Summary  Patient Identification:  Name: Bethanie Moody  Age: 62 y.o.  Sex: female  :  1960  MRN: [id number]    Admit date: 3/21/2023    Discharge date and time: 3/22/2023  7:36 PM    Admitting Physician: Leila Martins MD    Discharge Physician: Leila Martins MD    Admission Diagnoses: Prolapse of vaginal vault after hysterectomy [N99.3]  Cystocele, midline [N81.11]  Vaginal enterocele [N81.5]  Rectocele [N81.6]  Loss of perineal body, female [N81.89]  Female stress incontinence [N39.3]  Slow transit constipation [K59.01]  Fecal incontinence [R15.9]  Incomplete defecation [R15.0]    Hospital Problems:   Principal Problem:    Rectocele  Active Problems:    Prolapse of vaginal vault after hysterectomy    Cystocele, midline    Vaginal enterocele    Loss of perineal body, female    Female stress incontinence    Slow transit constipation    Fecal incontinence    Incomplete defecation        Discharge Diagnoses: same    Discharged Condition: good    Hospital Course:   surgery and post operative care  Ambulating   Tolerating diet  Passed flatus  Passed voiding trial  surgery and post operative care    Disposition:  Home        Follow-up Information     Leila Martins MD. Go to.    Specialties: Urogynecology, Gynecology, Obstetrics and Gynecology  Contact information:  [address] [facility] [address]  [place] [id number] [phone]             Radha Sepulveda MD .    Specialty: Internal Medicine  Contact information:  [address] [place] [id number] [phone]                               Signed:  Leila Martins MD  2023

## 2023-08-28 ENCOUNTER — TELEPHONE (OUTPATIENT)
Dept: GASTROENTEROLOGY | Facility: CLINIC | Age: 63
End: 2023-08-28
Payer: COMMERCIAL

## 2023-08-28 NOTE — TELEPHONE ENCOUNTER
"Pt stated off and on for the last month, she has been experiencing \"sulfur burps\"  last night she started with diarrhea and vomiting.  The vomiting has subsided, but she is reporting that she is still having diarrhea. She said this is similar to what put her in the hospital in the past.    She is also reporting bright red blood on the tissue when she wipes.  She has not seen this in the toilet water.    I have scheduled her to see Jessica on 9/26, is there anything you would like for her to do between now and her fu?  "

## 2023-08-28 NOTE — TELEPHONE ENCOUNTER
Recommend liquid diet and then advance to soft solids.  Consider holding the next dose of Ozempic to allow symptoms to settle down.  Trial Preparation H suppositories for hemorrhoidal irritation related to diarrhea

## 2023-08-28 NOTE — TELEPHONE ENCOUNTER
----- Message from Bethanie Moody sent at 8/28/2023 10:49 AM EDT -----  Regarding: Diarrhea and vomiting like when hospitalized   Contact: [phone]  Dr Fenton  I experienced an episode last night just like I did a few years ago and was hospitalized. Everything started the same way, but this time I had blood when I wiped. It may be from a hemorrhoid but I'm concerned. I don't have an appointment until November with Rosemary. Should I be concerned or just wait this out?

## 2023-08-29 ENCOUNTER — TELEPHONE (OUTPATIENT)
Dept: GASTROENTEROLOGY | Facility: CLINIC | Age: 63
End: 2023-08-29
Payer: COMMERCIAL

## 2023-08-29 DIAGNOSIS — R19.7 DIARRHEA OF PRESUMED INFECTIOUS ORIGIN: Primary | ICD-10-CM

## 2023-08-29 NOTE — TELEPHONE ENCOUNTER
Per Dr Fenton  Given persistent issues with systemic symptoms, would check stool studies for infection-please put a stool kit for her to  at the      My chart message to pt and stool kit placed at the  for her to

## 2023-08-29 NOTE — TELEPHONE ENCOUNTER
Given persistent issues with systemic symptoms, would check stool studies for infection-please put a stool kit for her to  at the

## 2023-08-29 NOTE — TELEPHONE ENCOUNTER
----- Message from Jacquelin San RN sent at 8/29/2023  7:57 AM EDT -----  Regarding: FW: Previous message  Contact: [phone]    ----- Message -----  From: Bethanie Moody  Sent: 8/29/2023   7:46 AM EDT  To: [facility]  Subject: Previous message                                 Just wanted to let you know the diarrhea and nausea are back with a headache. I will also let my pcp know. Also , this is disgusting but the smell has been horrendous.  Could I have a virus of some kind? Thanks

## 2023-09-22 ENCOUNTER — TELEPHONE (OUTPATIENT)
Dept: GASTROENTEROLOGY | Facility: CLINIC | Age: 63
End: 2023-09-22
Payer: COMMERCIAL

## 2023-09-22 NOTE — TELEPHONE ENCOUNTER
----- Message from Karolina Fenton MD sent at 9/22/2023 10:56 AM EDT -----  Stool study positive for e coli.  Recommend conservative management with fluid and electrolyte replacement, bland diet as tolerated.  Symptoms usually resolve within a few days

## 2023-09-26 ENCOUNTER — OFFICE VISIT (OUTPATIENT)
Dept: GASTROENTEROLOGY | Facility: CLINIC | Age: 63
End: 2023-09-26
Payer: COMMERCIAL

## 2023-09-26 VITALS
HEIGHT: 70 IN | SYSTOLIC BLOOD PRESSURE: 117 MMHG | OXYGEN SATURATION: 96 % | DIASTOLIC BLOOD PRESSURE: 70 MMHG | TEMPERATURE: 97.5 F | HEART RATE: 77 BPM | WEIGHT: 216.4 LBS | BODY MASS INDEX: 30.98 KG/M2

## 2023-09-26 DIAGNOSIS — A04.0 INTESTINAL INFECTION DUE TO ENTEROPATHOGENIC E. COLI: ICD-10-CM

## 2023-09-26 DIAGNOSIS — K21.9 GASTROESOPHAGEAL REFLUX DISEASE WITHOUT ESOPHAGITIS: Primary | ICD-10-CM

## 2023-09-26 DIAGNOSIS — K59.04 CHRONIC IDIOPATHIC CONSTIPATION: ICD-10-CM

## 2023-09-26 DIAGNOSIS — K31.84 GASTROPARESIS: ICD-10-CM

## 2023-09-26 RX ORDER — PANTOPRAZOLE SODIUM 40 MG/1
40 TABLET, DELAYED RELEASE ORAL NIGHTLY
Qty: 90 TABLET | Refills: 3 | Status: SHIPPED | OUTPATIENT
Start: 2023-09-26

## 2023-09-26 RX ORDER — TIRZEPATIDE 2.5 MG/.5ML
INJECTION, SOLUTION SUBCUTANEOUS
COMMUNITY
Start: 2023-08-20

## 2023-09-26 RX ORDER — NITROFURANTOIN 25; 75 MG/1; MG/1
1 CAPSULE ORAL EVERY 12 HOURS SCHEDULED
COMMUNITY
Start: 2023-07-26

## 2023-09-26 NOTE — PROGRESS NOTES
"Chief Complaint  Gastroesophageal reflux disease without esophagitis    Subjective          History Of Present Illness:    Bethanie Moody is a  63 y.o. female patient of Dr. Emerson with a past medical history of gastroparesis, GERD, constipation who presents today as a follow-up.  She recently was experiencing diarrhea and malodorous burping.  She had a GI PCR performed that came back for EPEC.  Patient was just treated with conservative treatment.    Today she is feeling significantly better.  She is not have any further abdominal pain or diarrhea.  No rectal bleeding.  Her GERD is well controlled with nightly pantoprazole.  She had trialed the Pepcid which unfortunately was not efficacious for her and went back on a daily PPI.  No dysphagia.  Her appetite is good.  She is trying to intentionally lose weight on Ozempic.  For her constipation she is doing well with just daily Colace.  She has trialed Linzess in the past which is caused her significant diarrhea and abdominal discomfort.    Additional data reviewed:  EGD 2021 -normal esophagus, erosive gastritis, normal duodenum.  Gastric biopsies consistent with inflammation.  No H. pylori.  C-scope 2021 -trace colon, nonbleeding internal hemorrhoids.  No specimens collected recall 5 years    Objective   Vital Signs:   /70   Pulse 77   Temp 97.5 °F (36.4 °C)   Ht 177.8 cm (70\")   Wt 98.2 kg (216 lb 6.4 oz)   SpO2 96%   BMI 31.05 kg/m²       Physical Exam  Vitals reviewed.   Constitutional:       General: She is not in acute distress.     Appearance: Normal appearance. She is not ill-appearing.   HENT:      Head: Normocephalic and atraumatic.      Nose: Nose normal.      Mouth/Throat:      Pharynx: Oropharynx is clear.   Eyes:      Extraocular Movements: Extraocular movements intact.      Conjunctiva/sclera: Conjunctivae normal.      Pupils: Pupils are equal, round, and reactive to light.   Pulmonary:      Effort: Pulmonary effort is normal. "   Abdominal:      General: There is no distension.      Palpations: There is no mass.      Tenderness: There is no abdominal tenderness.   Musculoskeletal:         General: No swelling. Normal range of motion.      Cervical back: Normal range of motion.   Skin:     General: Skin is warm and dry.      Findings: No bruising or rash.   Neurological:      General: No focal deficit present.      Mental Status: She is alert and oriented to person, place, and time.      Motor: No weakness.      Gait: Gait normal.   Psychiatric:         Mood and Affect: Mood normal.        Result Review :   The following data was reviewed by: Jessica Wong PA-C on 09/26/2023:  CMP          3/2/2023    14:54 6/20/2023    09:34   CMP   Glucose 189  152    BUN 19  13    Creatinine 1.05  0.86    EGFR 60.6  77.0    Sodium 140  144    Potassium 3.9  4.2    Chloride 104  106    Calcium 9.2  9.4    BUN/Creatinine Ratio 18.1  15.1    Anion Gap 8.0  9.0      CBC w/diff          3/2/2023    14:54 3/22/2023    06:03 6/20/2023    09:34   CBC w/Diff   WBC 6.11   5.70    RBC 4.41   4.43    Hemoglobin 12.4  9.0  12.1    Hematocrit 37.6  27.1  36.3    MCV 85.3   81.9    MCH 28.1   27.3    MCHC 33.0   33.3    RDW 14.3   14.2    Platelets 94   109            Assessment and Plan    Diagnoses and all orders for this visit:    1. Gastroesophageal reflux disease without esophagitis (Primary)  -     pantoprazole (PROTONIX) 40 MG EC tablet; Take 1 tablet by mouth Every Night.  Dispense: 90 tablet; Refill: 3    2. Chronic idiopathic constipation    3. Gastroparesis    4. Intestinal infection due to enteropathogenic E. coli       Infection seems to have resolved with conservative measures.  No further abdominal pain or diarrhea.  Continue pantoprazole nightly for GERD  Constipation seems well controlled on just daily Colace.  Can add MiraLAX if needed.  We did discuss her gastroparesis and risk of being on Ozempic as well.  She seems to be tolerating it quite  well at her current dose.  If she does happen to increase her dosing and began having nausea and vomiting it is likely secondary to the Ozempic and she may consider going back down on her dose.    Follow Up   Return in about 1 year (around 9/26/2024) for Dr. Fenton or Jessica Pritchett PA-C.    Dragon dictation used throughout this note.            Jessica Pritchett PA-C   [facility]  [address] [place] [address]  Office: [phone]

## 2024-07-02 DIAGNOSIS — K21.9 GASTROESOPHAGEAL REFLUX DISEASE WITHOUT ESOPHAGITIS: ICD-10-CM

## 2024-07-05 RX ORDER — PANTOPRAZOLE SODIUM 40 MG/1
40 TABLET, DELAYED RELEASE ORAL
Qty: 90 TABLET | Refills: 3 | Status: SHIPPED | OUTPATIENT
Start: 2024-07-05

## 2024-08-15 ENCOUNTER — OFFICE VISIT (OUTPATIENT)
Dept: FAMILY MEDICINE CLINIC | Facility: CLINIC | Age: 64
End: 2024-08-15
Payer: COMMERCIAL

## 2024-08-15 VITALS
OXYGEN SATURATION: 96 % | BODY MASS INDEX: 26.28 KG/M2 | WEIGHT: 183.6 LBS | TEMPERATURE: 98.1 F | HEIGHT: 70 IN | SYSTOLIC BLOOD PRESSURE: 110 MMHG | HEART RATE: 86 BPM | DIASTOLIC BLOOD PRESSURE: 60 MMHG | RESPIRATION RATE: 16 BRPM

## 2024-08-15 DIAGNOSIS — K21.9 GASTROESOPHAGEAL REFLUX DISEASE WITHOUT ESOPHAGITIS: ICD-10-CM

## 2024-08-15 DIAGNOSIS — E55.9 VITAMIN D DEFICIENCY: ICD-10-CM

## 2024-08-15 DIAGNOSIS — Z11.1 TUBERCULOSIS SCREENING: ICD-10-CM

## 2024-08-15 DIAGNOSIS — E11.9 TYPE 2 DIABETES MELLITUS WITHOUT COMPLICATION, WITHOUT LONG-TERM CURRENT USE OF INSULIN: Primary | ICD-10-CM

## 2024-08-15 PROCEDURE — 99204 OFFICE O/P NEW MOD 45 MIN: CPT | Performed by: NURSE PRACTITIONER

## 2024-08-15 RX ORDER — METHYLPREDNISOLONE 4 MG/1
TABLET ORAL
COMMUNITY
Start: 2024-05-05 | End: 2024-08-15

## 2024-08-15 RX ORDER — DEXTROAMPHETAMINE SACCHARATE, AMPHETAMINE ASPARTATE MONOHYDRATE, DEXTROAMPHETAMINE SULFATE AND AMPHETAMINE SULFATE 2.5; 2.5; 2.5; 2.5 MG/1; MG/1; MG/1; MG/1
CAPSULE, EXTENDED RELEASE ORAL EVERY MORNING
COMMUNITY
Start: 2024-04-22 | End: 2024-08-15

## 2024-08-15 RX ORDER — DEXTROAMPHETAMINE SACCHARATE, AMPHETAMINE ASPARTATE MONOHYDRATE, DEXTROAMPHETAMINE SULFATE AND AMPHETAMINE SULFATE 5; 5; 5; 5 MG/1; MG/1; MG/1; MG/1
CAPSULE, EXTENDED RELEASE ORAL
COMMUNITY
Start: 2024-08-09

## 2024-08-15 RX ORDER — MINOXIDIL 2.5 MG/1
0.5 TABLET ORAL DAILY
COMMUNITY
Start: 2024-07-03

## 2024-08-15 NOTE — PROGRESS NOTES
Subjective   Bethanie Moody is a 64 y.o. female.     History of Present Illness   Bethanie Moody 64 y.o. female who presents today for a new patient appointment.    she has a history of   Patient Active Problem List   Diagnosis    Chronic idiopathic constipation    Colon polyps    Gastroesophageal reflux disease without esophagitis    Gastroparesis    Enteritis    Prolapse of vaginal vault after hysterectomy    Cystocele, midline    Vaginal enterocele    Rectocele    Loss of perineal body, female    Female stress incontinence    Slow transit constipation    Fecal incontinence    Incomplete defecation   .  she is here to establish care I reviewed the PFSH recorded today by my MA/LPN staff.   she   She has been feeling well.        Had MEREDITH with [facility] in 3/2021 due to uterine cancer.  Sees GYN annually for pelvic. Had mammogram in January.  Had breast cancer in 2010 and was on tamoxifen for 10 years.  She only had lumpectomy.  She did radiation and chemo.    Sees Dr. Handley with oncology at [facility] annually. Last appt was July.     Last colonoscopy was 2021.  Due in 2025.     She is up-to-date on Pap as of October and mammogram as of January.    She sees psychiatry who prescribes her Auvelity, Adderall XR and ropinirole.  She has been seeing dermatology for hair loss and is taking oral minoxidil.      She has history of GERD that she states is well-controlled on pantoprazole at current dose.  She has hypothyroidism, hyperlipidemia, hypertriglyceridemia and type 2 diabetes.  Her last A1c in March was down to 6.1.  She is currently on metformin and Ozempic for this.  She has been on 80 mg of atorvastatin but is questioning whether or not she needs that much.  Her LDL was 40.  She does not have any history of coronary artery disease or stroke.  She does not need medications refilled yet and will be due next month.  Will get repeat labs next month.  She will go ahead and cut her atorvastatin in half for 40 mg dose daily and we  will see how her cholesterol is doing in 4 weeks with labs.  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  She is okay to get quantiferon.    The following portions of the patient's history were reviewed and updated as appropriate: allergies, current medications, past family history, past medical history, past social history, past surgical history, and problem list.    Review of Systems   Constitutional:  Negative for unexpected weight change.   Respiratory:  Negative for shortness of breath.    Cardiovascular:  Negative for chest pain and palpitations.   Psychiatric/Behavioral:  Negative for behavioral problems.        Objective   Physical Exam  Vitals and nursing note reviewed.   Constitutional:       Appearance: Normal appearance. She is well-developed.   Neck:      Thyroid: No thyromegaly.      Vascular: No carotid bruit.   Cardiovascular:      Rate and Rhythm: Normal rate and regular rhythm.   Pulmonary:      Effort: Pulmonary effort is normal.      Breath sounds: Normal breath sounds.   Neurological:      Mental Status: She is alert and oriented to person, place, and time.   Psychiatric:         Mood and Affect: Mood normal.         Behavior: Behavior normal.         Thought Content: Thought content normal.         Judgment: Judgment normal.         Assessment & Plan   Diagnoses and all orders for this visit:    1. Type 2 diabetes mellitus without complication, without long-term current use of insulin (Primary)  -     Comprehensive metabolic panel  -     Lipid panel  -     CBC and Differential  -     TSH  -     Hemoglobin A1c  -     Vitamin D 25 hydroxy  -     MicroAlbumin, Urine, Random - Urine, Clean Catch  -     Hepatitis C antibody  -     QuantiFERON TB Gold    2. Vitamin D deficiency  -     Comprehensive metabolic panel  -     Lipid panel  -     CBC and Differential  -     TSH  -     Hemoglobin A1c  -     Vitamin D 25 hydroxy  -     MicroAlbumin, Urine, Random - Urine,  Clean Catch  -     Hepatitis C antibody  -     QuantiFERON TB Gold    3. Gastroesophageal reflux disease without esophagitis  -     Comprehensive metabolic panel  -     Lipid panel  -     CBC and Differential  -     TSH  -     Hemoglobin A1c  -     Vitamin D 25 hydroxy  -     MicroAlbumin, Urine, Random - Urine, Clean Catch  -     Hepatitis C antibody  -     QuantiFERON TB Gold    4. Tuberculosis screening  -     QuantiFERON TB Gold

## 2024-09-05 ENCOUNTER — TELEPHONE (OUTPATIENT)
Dept: FAMILY MEDICINE CLINIC | Facility: CLINIC | Age: 64
End: 2024-09-05
Payer: COMMERCIAL

## 2024-09-05 NOTE — TELEPHONE ENCOUNTER
Caller: Bethanie Moody    Relationship to patient: Self    Best call back number: [phone]     Additional notes:PATIENT WAS SEEN ON 08/15/24 AND IS SUPPOSED TO SCHEDULE A FOLLOW UP AFTER HAVING LABS DONE. SHE IS NOT SURE IF IT NEEDS TO BE A FOLLOW FROM LABS OR A 6 MONTH FOLLOW UP. PLEASE CALL ADVISE

## 2024-09-05 NOTE — TELEPHONE ENCOUNTER
Patient was informed that no follow-up is needed. Provider would like for patient to have labs drawn. Patient voiced understanding and had no further questions for our office. Labs were mailed to patient home

## 2024-09-16 DIAGNOSIS — E11.9 TYPE 2 DIABETES MELLITUS WITHOUT COMPLICATION, WITHOUT LONG-TERM CURRENT USE OF INSULIN: Primary | ICD-10-CM

## 2024-09-16 RX ORDER — ATORVASTATIN CALCIUM 40 MG/1
40 TABLET, FILM COATED ORAL NIGHTLY
Qty: 90 TABLET | Refills: 1 | Status: SHIPPED | OUTPATIENT
Start: 2024-09-16

## 2024-09-16 RX ORDER — SEMAGLUTIDE 0.68 MG/ML
0.5 INJECTION, SOLUTION SUBCUTANEOUS WEEKLY
Qty: 9 ML | Refills: 1 | Status: SHIPPED | OUTPATIENT
Start: 2024-09-16

## 2025-01-28 RX ORDER — LEVOTHYROXINE SODIUM 88 UG/1
88 TABLET ORAL NIGHTLY
OUTPATIENT
Start: 2025-01-28

## 2025-01-28 RX ORDER — ROPINIROLE 2 MG/1
2 TABLET, FILM COATED ORAL NIGHTLY
OUTPATIENT
Start: 2025-01-28

## 2025-02-16 ENCOUNTER — PATIENT MESSAGE (OUTPATIENT)
Dept: FAMILY MEDICINE CLINIC | Facility: CLINIC | Age: 65
End: 2025-02-16
Payer: COMMERCIAL

## 2025-02-17 DIAGNOSIS — E55.9 VITAMIN D DEFICIENCY: ICD-10-CM

## 2025-02-17 DIAGNOSIS — E11.9 TYPE 2 DIABETES MELLITUS WITHOUT COMPLICATION, WITHOUT LONG-TERM CURRENT USE OF INSULIN: Primary | ICD-10-CM

## 2025-02-18 RX ORDER — ROPINIROLE 2 MG/1
2 TABLET, FILM COATED ORAL
Qty: 90 TABLET | Refills: 0 | Status: SHIPPED | OUTPATIENT
Start: 2025-02-18

## 2025-02-28 ENCOUNTER — TELEPHONE (OUTPATIENT)
Dept: FAMILY MEDICINE CLINIC | Facility: CLINIC | Age: 65
End: 2025-02-28
Payer: COMMERCIAL

## 2025-02-28 DIAGNOSIS — E11.9 TYPE 2 DIABETES MELLITUS WITHOUT COMPLICATION, WITHOUT LONG-TERM CURRENT USE OF INSULIN: ICD-10-CM

## 2025-02-28 DIAGNOSIS — E55.9 VITAMIN D DEFICIENCY: ICD-10-CM

## 2025-02-28 NOTE — TELEPHONE ENCOUNTER
Caller: Bethanie Moody    Relationship: Self    Best call back number:     [phone] (Mobile)     What was the call regarding:     PATIENT IS NEEDING THE  LAB ORDERS TO GO TO   [facility] ON [address] / [address]           Is it okay if the provider responds through MyChart:

## 2025-02-28 NOTE — TELEPHONE ENCOUNTER
I reordered them as external labs.  She will have to take a lab order there as it does not directly go to [facility] from our system.

## 2025-03-20 RX ORDER — ATORVASTATIN CALCIUM 40 MG/1
40 TABLET, FILM COATED ORAL
Qty: 14 TABLET | Refills: 0 | Status: SHIPPED | OUTPATIENT
Start: 2025-03-20

## 2025-03-28 ENCOUNTER — OFFICE VISIT (OUTPATIENT)
Dept: FAMILY MEDICINE CLINIC | Facility: CLINIC | Age: 65
End: 2025-03-28
Payer: COMMERCIAL

## 2025-03-28 VITALS
HEIGHT: 70 IN | HEART RATE: 99 BPM | BODY MASS INDEX: 26.28 KG/M2 | TEMPERATURE: 98.1 F | WEIGHT: 183.6 LBS | RESPIRATION RATE: 16 BRPM | SYSTOLIC BLOOD PRESSURE: 118 MMHG | OXYGEN SATURATION: 97 % | DIASTOLIC BLOOD PRESSURE: 66 MMHG

## 2025-03-28 DIAGNOSIS — E11.9 TYPE 2 DIABETES MELLITUS WITHOUT COMPLICATION, WITHOUT LONG-TERM CURRENT USE OF INSULIN: ICD-10-CM

## 2025-03-28 DIAGNOSIS — G25.81 RLS (RESTLESS LEGS SYNDROME): ICD-10-CM

## 2025-03-28 DIAGNOSIS — E78.2 MIXED HYPERLIPIDEMIA: Primary | ICD-10-CM

## 2025-03-28 RX ORDER — SEMAGLUTIDE 0.68 MG/ML
0.5 INJECTION, SOLUTION SUBCUTANEOUS WEEKLY
Qty: 9 ML | Refills: 1 | Status: SHIPPED | OUTPATIENT
Start: 2025-03-28

## 2025-03-28 RX ORDER — ATORVASTATIN CALCIUM 40 MG/1
40 TABLET, FILM COATED ORAL
Qty: 90 TABLET | Refills: 1 | Status: SHIPPED | OUTPATIENT
Start: 2025-03-28

## 2025-03-28 RX ORDER — ROPINIROLE 2 MG/1
2 TABLET, FILM COATED ORAL
Qty: 90 TABLET | Refills: 1 | Status: SHIPPED | OUTPATIENT
Start: 2025-03-28

## 2025-03-28 NOTE — PROGRESS NOTES
"Subjective   Bethanie Moody is a 64 y.o. female.     History of Present Illness    Since the last visit, she has overall felt fairly well.  She has DMII well controlled on medication and will continue regimen, Hyperlipidemia with goals met with current Rx, and Hypothyroidism well controlled on current medication and will continue Rx.  she has been compliant with current medications have reviewed them.  The patient denies medication side effects.  Will refill medications. /66 (BP Location: Left arm, Patient Position: Sitting, Cuff Size: Adult)   Pulse 99   Temp 98.1 °F (36.7 °C) (Temporal)   Resp 16   Ht 177.8 cm (70\")   Wt 83.3 kg (183 lb 9.6 oz)   LMP 06/22/2010   SpO2 97%   BMI 26.34 kg/m² .          Results for orders placed or performed in visit on 08/29/23   Gastrointestinal Panel, PCR - Stool, Per Rectum    Collection Time: 09/20/23  9:25 AM    Specimen: Per Rectum; Stool    ST   Result Value Ref Range    Campylobacter Not Detected Not Detected    C.difficile toxin A/B Not Detected Not Detected    Plesiomonas shigelloides Not Detected Not Detected    Salmonella Not Detected Not Detected    Vibrio Not Detected Not Detected    Vibrio cholerae Not Detected Not Detected    Yersinia enterocolitica Not Detected Not Detected    Enteroaggregative E. coli Not Detected Not Detected    Enteropathogenic E. coli Detected (A) Not Detected    Enterotoxigenic E. coli Not Detected Not Detected    Shiga-like toxin-producing E. coli  Not Detected Not Detected    E. coli O157 Not applicable Not Detected    Shigella enteroinvasive E. coli Not Detected Not Detected    Cryptosporidium Not Detected Not Detected    Cyclospora cayetanensis Not Detected Not Detected    Entamoeba Histolytica Ag Not Detected Not Detected    Giardia lamblia Not Detected Not Detected    Adenovirus 40/41 Ag Not Detected Not Detected    Astrovirus Not Detected Not Detected    Norovirus GI/GII Not Detected Not Detected    Rotavirus A Not " Detected Not Detected    Sapovirus Not Detected Not Detected   Clostridioides difficile EIA - Stool, Per Rectum    Collection Time: 09/20/23  9:25 AM    Specimen: Per Rectum; Stool    ST   Result Value Ref Range    C difficile Toxins A+B, EIA Negative Negative     She is still seeing psychiatry for ADHD/anxiety/depression.       Labs reviewed with pt today during visit. All questions answered.      The following portions of the patient's history were reviewed and updated as appropriate: allergies, current medications, past family history, past medical history, past social history, past surgical history, and problem list.    Review of Systems   Constitutional:  Negative for unexpected weight change.   Respiratory:  Negative for shortness of breath.    Cardiovascular:  Negative for chest pain and palpitations.   Psychiatric/Behavioral:  Negative for behavioral problems.        Objective   Physical Exam  Vitals and nursing note reviewed.   Constitutional:       Appearance: Normal appearance. She is well-developed.   Neck:      Thyroid: No thyromegaly.      Vascular: No carotid bruit.   Cardiovascular:      Rate and Rhythm: Normal rate and regular rhythm.   Pulmonary:      Effort: Pulmonary effort is normal.      Breath sounds: Normal breath sounds.   Neurological:      Mental Status: She is alert and oriented to person, place, and time.   Psychiatric:         Mood and Affect: Mood normal.         Behavior: Behavior normal.         Thought Content: Thought content normal.         Judgment: Judgment normal.         Assessment & Plan   Diagnoses and all orders for this visit:    1. Mixed hyperlipidemia (Primary)  -     atorvastatin (LIPITOR) 40 MG tablet; Take 1 tablet by mouth every night at bedtime.  Dispense: 90 tablet; Refill: 1    2. Type 2 diabetes mellitus without complication, without long-term current use of insulin  -     Semaglutide,0.25 or 0.5MG/DOS, (Ozempic, 0.25 or 0.5 MG/DOSE,) 2 MG/3ML solution  pen-injector; Inject 0.5 mg under the skin into the appropriate area as directed 1 (One) Time Per Week. takes on sunday  Dispense: 9 mL; Refill: 1    3. RLS (restless legs syndrome)  -     rOPINIRole (REQUIP) 2 MG tablet; Take 1 tablet by mouth every night at bedtime.  Dispense: 90 tablet; Refill: 1

## (undated) DEVICE — DRP Z/FRICTION 10X16IN

## (undated) DEVICE — Device

## (undated) DEVICE — INTENDED FOR TISSUE SEPARATION, AND OTHER PROCEDURES THAT REQUIRE A SHARP SURGICAL BLADE TO PUNCTURE OR CUT.: Brand: BARD-PARKER ® CARBON RIB-BACK BLADES

## (undated) DEVICE — CONN TBG Y 5 IN 1 LF STRL

## (undated) DEVICE — PCH INST SURG INVISISHIELD 2PCKT

## (undated) DEVICE — VAGINAL PACKING: Brand: DEROYAL

## (undated) DEVICE — BANDAGE,GAUZE,BULKEE II,4.5"X4.1YD,STRL: Brand: MEDLINE

## (undated) DEVICE — TBG PENCL TELESCP MEGADYNE SMOKE EVAC 10FT

## (undated) DEVICE — RETR STAY HK ELAS SHRP 5MM PK/8

## (undated) DEVICE — ANTIBACTERIAL UNDYED BRAIDED (POLYGLACTIN 910), SYNTHETIC ABSORBABLE SUTURE: Brand: COATED VICRYL

## (undated) DEVICE — MAGNETIC INSTR DRAPE 20X16: Brand: MEDLINE INDUSTRIES, INC.

## (undated) DEVICE — SMOKE EVACUATION TUBING WITH 7/8 IN TO 1/4 IN REDUCER: Brand: BUFFALO FILTER

## (undated) DEVICE — SYR CONTRL PRESS/LO FIX/M/LL W/THMB/RNG 10ML

## (undated) DEVICE — IRRIGATOR BULB ASEPTO 60CC STRL

## (undated) DEVICE — PCH SURG INVISISHIELD FLD/COL W/DRN/PRT 20X6IN

## (undated) DEVICE — DRAPE,UNDERBUTTOCKS,PCH,STERILE: Brand: MEDLINE

## (undated) DEVICE — SUT VIC 0 TIES 18IN J912G

## (undated) DEVICE — SUT PDS 0 CT2 27IN DYED Z334H

## (undated) DEVICE — DEV CAPTURE SUT CAPIO/SLIM 11MM 25CM

## (undated) DEVICE — LEGGINGS, PAIR, CLEAR, STERILE: Brand: MEDLINE

## (undated) DEVICE — INTENT TO BE USED WITH SUTURE MATERIAL FOR TISSUE CLOSURE: Brand: RICHARD-ALLAN®  NEEDLE 1/2 CIRCLE REVERSE CUTTING

## (undated) DEVICE — SINGLE BASIN: Brand: CARDINAL HEALTH

## (undated) DEVICE — TTL1LYR 16FR10ML 100%SIL TMPST TR: Brand: MEDLINE

## (undated) DEVICE — GLV SURG BIOGEL LTX PF 6 1/2

## (undated) DEVICE — MAT FLR ABSORBENT LG 4FT 10 2.5FT

## (undated) DEVICE — TOWEL,OR,DSP,ST,BLUE,STD,4/PK,20PK/CS: Brand: MEDLINE

## (undated) DEVICE — RETR RNG GEN2 31.8X18.3CM

## (undated) DEVICE — NEEDLE, QUINCKE, 18GX3.5": Brand: MEDLINE

## (undated) DEVICE — TUBING, SUCTION, 1/4" X 20', STRAIGHT: Brand: MEDLINE INDUSTRIES, INC.

## (undated) DEVICE — DECANTER BAG 9": Brand: MEDLINE INDUSTRIES, INC.

## (undated) DEVICE — ST IRR CYSTO W/SPK 77IN LF

## (undated) DEVICE — COVER,TABLE,44X90,STERILE: Brand: MEDLINE

## (undated) DEVICE — SYRINGE, LUER LOCK, 60ML: Brand: MEDLINE

## (undated) DEVICE — NDL HYPO PRECISIONGLIDE REG 25G 1 1/2

## (undated) DEVICE — SOL NACL 0.9PCT 1000ML

## (undated) DEVICE — CATHETER,FOLEY,100%SILICONE,16FR,10ML,LF: Brand: MEDLINE

## (undated) DEVICE — CONTN STRL 32OZ

## (undated) DEVICE — DRAPE,REIN 53X77,STERILE: Brand: MEDLINE

## (undated) DEVICE — SUT VIC 2/0 CT2 27IN J269H

## (undated) DEVICE — TRAP FLD MINIVAC MEGADYNE 100ML

## (undated) DEVICE — PK HYST VAG 40

## (undated) DEVICE — PUNCH BIOP 2MM

## (undated) DEVICE — SUT NONABS CAPIO COAT/BR DBL/ARM TC/TPRCUT T/26MM 36IN